# Patient Record
Sex: MALE | Race: WHITE | Employment: FULL TIME | URBAN - METROPOLITAN AREA
[De-identification: names, ages, dates, MRNs, and addresses within clinical notes are randomized per-mention and may not be internally consistent; named-entity substitution may affect disease eponyms.]

---

## 2017-01-06 ENCOUNTER — OFFICE VISIT (OUTPATIENT)
Dept: FAMILY MEDICINE CLINIC | Age: 57
End: 2017-01-06

## 2017-01-06 VITALS
DIASTOLIC BLOOD PRESSURE: 84 MMHG | RESPIRATION RATE: 18 BRPM | SYSTOLIC BLOOD PRESSURE: 138 MMHG | TEMPERATURE: 98.2 F | BODY MASS INDEX: 39.34 KG/M2 | WEIGHT: 281 LBS | HEIGHT: 71 IN | HEART RATE: 100 BPM | OXYGEN SATURATION: 98 %

## 2017-01-06 DIAGNOSIS — I10 ESSENTIAL HYPERTENSION: ICD-10-CM

## 2017-01-06 DIAGNOSIS — B19.20 HEPATITIS C VIRUS INFECTION WITHOUT HEPATIC COMA, UNSPECIFIED CHRONICITY: ICD-10-CM

## 2017-01-06 DIAGNOSIS — R79.89 ELEVATED LFTS: ICD-10-CM

## 2017-01-06 DIAGNOSIS — S83.412A SPRAIN OF MEDIAL COLLATERAL LIGAMENT OF LEFT KNEE, INITIAL ENCOUNTER: ICD-10-CM

## 2017-01-06 DIAGNOSIS — R73.03 PREDIABETES: Primary | ICD-10-CM

## 2017-01-06 RX ORDER — LISINOPRIL 10 MG/1
10 TABLET ORAL DAILY
Qty: 30 TAB | Refills: 1 | Status: SHIPPED | OUTPATIENT
Start: 2017-01-06 | End: 2017-01-23

## 2017-01-06 RX ORDER — DICLOFENAC SODIUM 75 MG/1
75 TABLET, DELAYED RELEASE ORAL 2 TIMES DAILY
Qty: 60 TAB | Refills: 1 | Status: SHIPPED | OUTPATIENT
Start: 2017-01-06 | End: 2017-01-20

## 2017-01-06 NOTE — PROGRESS NOTES
Myrna Garcia is a 64 y.o. male   Chief Complaint   Patient presents with    Knee Pain     right    pt states has some R knee pain and swelling. Denies any trauma, majority of pain is medial per patient. Took some motrin 800mg bid and helped with the swelling but did not alleviate any of the pain. Pt also with prediabetes and elevated LFT's on prior check. Will check labs and have discussed diabetic diet. Pt states he thinks he has Hep C from an incident at [facility].  Pt Bp is elevated and did come down after sitting for a bit but is still high normal.      Chief Complaint   Patient presents with    Knee Pain     right     he is a 64y.o. year old male who presents for evalution. Reviewed PmHx, RxHx, FmHx, SocHx, AllgHx and updated and dated in the chart. Review of Systems - negative except as listed above in the HPI    Objective:     Vitals:    01/06/17 0914 01/06/17 0935   BP: 142/90 138/84   Pulse: 100    Resp: 18    Temp: 98.2 °F (36.8 °C)    TempSrc: Oral    SpO2: 98%    Weight: 281 lb (127.5 kg)    Height: 5' 10.5\" (1.791 m)        Current Outpatient Prescriptions   Medication Sig    diclofenac EC (VOLTAREN) 75 mg EC tablet Take 1 Tab by mouth two (2) times a day. As needed    lisinopril (PRINIVIL, ZESTRIL) 10 mg tablet Take 1 Tab by mouth daily.  amLODIPine (NORVASC) 10 mg tablet TAKE 1 TABLET BY MOUTH DAILY     No current facility-administered medications for this visit. Physical Examination: General appearance - alert, well appearing, and in no distress  Eyes - pupils equal and reactive, extraocular eye movements intact  Chest - clear to auscultation, no wheezes, rales or rhonchi, symmetric air entry  Heart - normal rate, regular rhythm, normal S1, S2, no murmurs, rubs, clicks or gallops  Musculoskeletal - mild edema without erythema  ttp over MCl and pain with valgus stress. Assessment/ Plan:   Brenda Pritchett was seen today for knee pain.     Diagnoses and all orders for this visit:    Prediabetes  -     HEMOGLOBIN A1C WITH EAG  -     METABOLIC PANEL, COMPREHENSIVE    Sprain of medial collateral ligament of left knee, initial encounter  -     diclofenac EC (VOLTAREN) 75 mg EC tablet; Take 1 Tab by mouth two (2) times a day. As needed    Elevated LFTs  -     METABOLIC PANEL, COMPREHENSIVE    Hepatitis C virus infection without hepatic coma, unspecified chronicity  -     HEPATITIS C QT BY PCR WITH REFLEX GENOTYPE    Essential hypertension  -     lisinopril (PRINIVIL, ZESTRIL) 10 mg tablet; Take 1 Tab by mouth daily. Follow-up Disposition:  Return in about 1 month (around 2/6/2017), or if symptoms worsen or fail to improve. I have discussed the diagnosis with the patient and the intended plan as seen in the above orders. The patient has received an after-visit summary and questions were answered concerning future plans. Pt conveyed understanding of plan.     Medication Side Effects and Warnings were discussed with patient      Stacy Mei, DO

## 2017-01-06 NOTE — PATIENT INSTRUCTIONS
Medial Collateral Ligament Sprain: Rehab Exercises  Your Care Instructions  Here are some examples of typical rehabilitation exercises for your condition. Start each exercise slowly. Ease off the exercise if you start to have pain. Your doctor or physical therapist will tell you when you can start these exercises and which ones will work best for you. How to do the exercises  Knee flexion with heel slide    1. Lie on your back with your knees bent. 2. Slide your heel back by bending your affected knee as far as you can. Then hook your other foot around your ankle to help pull your heel even farther back. 3. Hold for about 6 seconds, then rest for up to 10 seconds. 4. Repeat 8 to 12 times. Heel slides on a wall    1. Lie on the floor close enough to a wall so that you can place both legs up on the wall. Your hips should be as close to the wall as is comfortable for you. 2. Start with both feet resting on the wall. Slowly let the foot of your affected leg slide down the wall until you feel a stretch in your knee. 3. Hold for 15 to 30 seconds. 4. Then slowly slide your foot up to where you started. 5. Repeat 2 to 4 times. Quad sets    1. Sit with your affected leg straight and supported on the floor or a firm bed. Place a small, rolled-up towel under your knee. Your other leg should be bent, with that foot flat on the floor. 2. Tighten the thigh muscles of your affected leg by pressing the back of your knee down into the towel. 3. Hold for about 6 seconds, then rest for up to 10 seconds. 4. Repeat 8 to 12 times. Short-arc quad    1. Lie on your back with your knees bent over a foam roll or a large rolled-up towel. 2. Lift the lower part of your affected leg and straighten your knee by tightening your thigh muscle. Keep the bottom of your knee on the foam roll or rolled-up towel. 3. Hold your knee straight for about 6 seconds, then slowly bend your knee and lower your leg back to the floor.  Rest for up to 10 seconds between repetitions. 4. Repeat 8 to 12 times. Straight-leg raises to the front    1. Lie on your back with your good knee bent so that your foot rests flat on the floor. Your affected leg should be straight. Make sure that your low back has a normal curve. You should be able to slip your hand in between the floor and the small of your back, with your palm touching the floor and your back touching the back of your hand. 2. Tighten the thigh muscles in your affected leg by pressing the back of your knee flat down to the floor. Hold your knee straight. 3. Keeping the thigh muscles tight and your leg straight, lift your affected leg up so that your heel is about 12 inches off the floor. Hold for about 6 seconds, then lower slowly. 4. Relax for up to 10 seconds between repetitions. 5. Repeat 8 to 12 times. Hamstring set (heel dig)    1. Sit with your affected leg bent. Your good leg should be straight and supported on the floor. 2. Tighten the muscles on the back of your bent leg (hamstring) by pressing your heel into the floor. 3. Hold for about 6 seconds, then rest for up to 10 seconds. 4. Repeat 8 to 12 times. Hip adduction    Note: You will need a pillow for this exercise. 1. Sit on the floor with your knees bent. 2. Place a pillow between your knees. 3. Put your hands slightly behind your hips for support. 4. Squeeze the pillow by tightening the muscles on the inside of your thighs. 5. Hold for 6 seconds, then rest for up to 10 seconds. 6. Repeat 8 to 12 times. Hip abduction    Note: You will need a small pillow for this exercise. 1. Sit on the floor with your affected knee close to a wall. 2. Bend your affected knee but keep the other leg straight in front of you. 3. Place a pillow between the outside of your knee and the wall. 4. Put your hands slightly behind your hips for support. 5. Push the outside of your knee against the pillow and the wall.   6. Hold for 6 seconds, then rest for up to 10 seconds. 7. Repeat 8 to 12 times. Lateral step-up    1. Stand sideways on the bottom step of a staircase with your injured leg on the step and your other foot on the floor. Hold on to the banister or wall. 2. Use your injured leg to raise yourself up, bringing your other foot level with the stair step. Make sure to keep your hips level as you do this. And try to keep your knee moving in a straight line with your middle toe. Do not put the foot you are raising on the stair step. 3. Slowly lower your foot back down. 4. Repeat 8 to 12 times. Wall squats with ball    Note: You will need a large therapy ball for this exercise. Ask your doctor or physical therapist what size you will need, but it should be large enough to cover your back. 1. Stand with your back facing a wall. Place your feet about a shoulder-width apart. 2. Place the therapy ball between your back and the wall, and move your feet out in front of you so they are about a foot in front of your hips. 3. Keep your arms at your sides, or put your hands on your hips. 4. Slowly squat down as if you are going to sit in a chair, rolling your back over the ball as you squat. The ball should move with you but stay pressed into the wall. 5. Be sure that your knees do not go in front of your toes as you squat. 6. Hold for 6 seconds. 7. Slowly rise to your standing position. 8. Repeat 8 to 12 times. Follow-up care is a key part of your treatment and safety. Be sure to make and go to all appointments, and call your doctor if you are having problems. It's also a good idea to know your test results and keep a list of the medicines you take. Where can you learn more? Go to http://radha-miranda.info/. Enter R100 in the search box to learn more about \"Medial Collateral Ligament Sprain: Rehab Exercises. \"  Current as of: May 23, 2016  Content Version: 11.1  © 4765-8285 [employer].  Care instructions adapted under license by [employer] (which disclaims liability or warranty for this information). If you have questions about a medical condition or this instruction, always ask your healthcare professional. [address] any warranty or liability for your use of this information. Prediabetes: Care Instructions  Your Care Instructions  Prediabetes is a warning sign that you are at risk for getting type 2 diabetes. It means that your blood sugar is higher than it should be. The food you eat turns into sugar, which your body uses for energy. Normally, an organ called the pancreas makes insulin, which allows the sugar in your blood to get into your body's cells. But when your body can't use insulin the right way, the sugar doesn't move into cells. It stays in your blood instead. This is called insulin resistance. The buildup of sugar in the blood causes prediabetes. The good news is that lifestyle changes may help you get your blood sugar back to normal and help you avoid or delay diabetes. Follow-up care is a key part of your treatment and safety. Be sure to make and go to all appointments, and call your doctor if you are having problems. It's also a good idea to know your test results and keep a list of the medicines you take. How can you care for yourself at home? · Watch your weight. A healthy weight helps your body use insulin properly. · Limit the amount of calories, sweets, and unhealthy fat you eat. Ask your doctor if you should see a dietitian. A registered dietitian can help you create meal plans that fit your lifestyle. · Get at least 30 minutes of exercise on most days of the week. Exercise helps control your blood sugar. It also helps you maintain a healthy weight. Walking is a good choice. You also may want to do other activities, such as running, swimming, cycling, or playing tennis or team sports. · Do not smoke. Smoking can make prediabetes worse.  If you need help quitting, talk to your doctor about stop-smoking programs and medicines. These can increase your chances of quitting for good. · If your doctor prescribed medicines, take them exactly as prescribed. Call your doctor if you think you are having a problem with your medicine. You will get more details on the specific medicines your doctor prescribes. When should you call for help? Watch closely for changes in your health, and be sure to contact your doctor if:  · You have any symptoms of diabetes. These may include:  ¨ Being thirsty more often. ¨ Urinating more. ¨ Being hungrier. ¨ Losing weight. ¨ Being very tired. ¨ Having blurry vision. · You have a wound that will not heal.  · You have an infection that will not go away. · You have problems with your blood pressure. · You want more information about diabetes and how you can keep from getting it. Where can you learn more? Go to http://radha-miranda.info/. Enter I222 in the search box to learn more about \"Prediabetes: Care Instructions. \"  Current as of: May 23, 2016  Content Version: 11.1  © 6410-4495 [employer]. Care instructions adapted under license by [employer]. (which disclaims liability or warranty for this information). If you have questions about a medical condition or this instruction, always ask your healthcare professional. [address] any warranty or liability for your use of this information.

## 2017-01-06 NOTE — MR AVS SNAPSHOT
Visit Information Date & Time Provider Department Dept. Phone Encounter #  
 1/6/2017  9:00 AM Chaya , [address] [phone] [id number] Follow-up Instructions Return in about 1 month (around 2/6/2017), or if symptoms worsen or fail to improve. Upcoming Health Maintenance Date Due COLONOSCOPY 12/13/1978 Pneumococcal 19-64 Medium Risk (1 of 1 - PPSV23) 12/13/1979 DTaP/Tdap/Td series (1 - Tdap) 12/13/1981 Allergies as of 1/6/2017  Review Complete On: 1/6/2017 By: Chaya Alvarado,  No Known Allergies Current Immunizations  Reviewed on 12/14/2015 No immunizations on file. Not reviewed this visit You Were Diagnosed With   
  
 Codes Comments Prediabetes    -  Primary ICD-10-CM: R73.03 
ICD-9-CM: 790.29 Sprain of medial collateral ligament of left knee, initial encounter     ICD-10-CM: U63.877H ICD-9-CM: 844.1 Elevated LFTs     ICD-10-CM: R79.89 ICD-9-CM: 790.6 Hepatitis C virus infection without hepatic coma, unspecified chronicity     ICD-10-CM: B19.20 ICD-9-CM: 070.70 Essential hypertension     ICD-10-CM: I10 
ICD-9-CM: 401.9 Vitals BP Pulse Temp Resp Height(growth percentile) Weight(growth percentile) 138/84 (BP 1 Location: Right arm, BP Patient Position: Sitting) 100 98.2 °F (36.8 °C) (Oral) 18 5' 10.5\" (1.791 m) 281 lb (127.5 kg) SpO2 BMI Smoking Status 98% 39.75 kg/m2 Never Smoker Vitals History BMI and BSA Data Body Mass Index Body Surface Area 39.75 kg/m 2 2.52 m 2 Preferred Pharmacy Pharmacy Name Phone [id number], [address] [place] [phone] Your Updated Medication List  
  
   
This list is accurate as of: 1/6/17  9:42 AM.  Always use your most recent med list. amLODIPine 10 mg tablet Commonly known as:  [place] TAKE 1 TABLET BY MOUTH DAILY  
  
 diclofenac EC 75 mg EC tablet Commonly known as:  VOLTAREN Take 1 Tab by mouth two (2) times a day. As needed  
  
 lisinopril 10 mg tablet Commonly known as:  Donnald Code Take 1 Tab by mouth daily. Prescriptions Sent to Pharmacy Refills  
 diclofenac EC (VOLTAREN) 75 mg EC tablet 1 Sig: Take 1 Tab by mouth two (2) times a day. As needed Class: Normal  
 Pharmacy: [facility], [address] Ph #: [phone] Route: Oral  
 lisinopril (PRINIVIL, ZESTRIL) 10 mg tablet 1 Sig: Take 1 Tab by mouth daily. Class: Normal  
 Pharmacy: [facility] Ph #: [phone] Route: Oral  
  
We Performed the Following HEMOGLOBIN A1C WITH EAG [42777 CPT(R)] HEPATITIS C QT BY PCR WITH REFLEX GENOTYPE [[id number] Custom] METABOLIC PANEL, COMPREHENSIVE [74422 CPT(R)] Follow-up Instructions Return in about 1 month (around 2/6/2017), or if symptoms worsen or fail to improve. Patient Instructions Medial Collateral Ligament Sprain: Rehab Exercises Your Care Instructions Here are some examples of typical rehabilitation exercises for your condition. Start each exercise slowly. Ease off the exercise if you start to have pain. Your doctor or physical therapist will tell you when you can start these exercises and which ones will work best for you. How to do the exercises Knee flexion with heel slide 1. Lie on your back with your knees bent. 2. Slide your heel back by bending your affected knee as far as you can. Then hook your other foot around your ankle to help pull your heel even farther back. 3. Hold for about 6 seconds, then rest for up to 10 seconds. 4. Repeat 8 to 12 times. Heel slides on a wall 1. Lie on the floor close enough to a wall so that you can place both legs up on the wall. Your hips should be as close to the wall as is comfortable for you. 2. Start with both feet resting on the wall. Slowly let the foot of your affected leg slide down the wall until you feel a stretch in your knee. 3. Hold for 15 to 30 seconds. 4. Then slowly slide your foot up to where you started. 5. Repeat 2 to 4 times. [employer] 1. Sit with your affected leg straight and supported on the floor or a firm bed. Place a small, rolled-up towel under your knee. Your other leg should be bent, with that foot flat on the floor. 2. Tighten the thigh muscles of your affected leg by pressing the back of your knee down into the towel. 3. Hold for about 6 seconds, then rest for up to 10 seconds. 4. Repeat 8 to 12 times. Short-arc quad 1. Lie on your back with your knees bent over a foam roll or a large rolled-up towel. 2. Lift the lower part of your affected leg and straighten your knee by tightening your thigh muscle. Keep the bottom of your knee on the foam roll or rolled-up towel. 3. Hold your knee straight for about 6 seconds, then slowly bend your knee and lower your leg back to the floor. Rest for up to 10 seconds between repetitions. 4. Repeat 8 to 12 times. Straight-leg raises to the front 1. Lie on your back with your good knee bent so that your foot rests flat on the floor. Your affected leg should be straight. Make sure that your low back has a normal curve. You should be able to slip your hand in between the floor and the small of your back, with your palm touching the floor and your back touching the back of your hand. 2. Tighten the thigh muscles in your affected leg by pressing the back of your knee flat down to the floor. Hold your knee straight. 3. Keeping the thigh muscles tight and your leg straight, lift your affected leg up so that your heel is about 12 inches off the floor. Hold for about 6 seconds, then lower slowly. 4. Relax for up to 10 seconds between repetitions. 5. Repeat 8 to 12 times. Hamstring set (heel dig) 1. Sit with your affected leg bent. Your good leg should be straight and supported on the floor. 2. Tighten the muscles on the back of your bent leg (hamstring) by pressing your heel into the floor. 3. Hold for about 6 seconds, then rest for up to 10 seconds. 4. Repeat 8 to 12 times. Hip adduction Note: You will need a pillow for this exercise. 1. Sit on the floor with your knees bent. 2. Place a pillow between your knees. 3. Put your hands slightly behind your hips for support. 4. Squeeze the pillow by tightening the muscles on the inside of your thighs. 5. Hold for 6 seconds, then rest for up to 10 seconds. 6. Repeat 8 to 12 times. Hip abduction Note: You will need a small pillow for this exercise. 1. Sit on the floor with your affected knee close to a wall. 2. Bend your affected knee but keep the other leg straight in front of you. 3. Place a pillow between the outside of your knee and the wall. 4. Put your hands slightly behind your hips for support. 5. Push the outside of your knee against the pillow and the wall. 6. Hold for 6 seconds, then rest for up to 10 seconds. 7. Repeat 8 to 12 times. Lateral step-up 1. Stand sideways on the bottom step of a staircase with your injured leg on the step and your other foot on the floor. Hold on to the banister or wall. 2. Use your injured leg to raise yourself up, bringing your other foot level with the stair step. Make sure to keep your hips level as you do this. And try to keep your knee moving in a straight line with your middle toe. Do not put the foot you are raising on the stair step. 3. Slowly lower your foot back down. 4. Repeat 8 to 12 times. Wall squats with ball Note: You will need a large therapy ball for this exercise. Ask your doctor or physical therapist what size you will need, but it should be large enough to cover your back. 1. Stand with your back facing a wall. Place your feet about a shoulder-width apart. 2. Place the therapy ball between your back and the wall, and move your feet out in front of you so they are about a foot in front of your hips. 3. Keep your arms at your sides, or put your hands on your hips. 4. Slowly squat down as if you are going to sit in a chair, rolling your back over the ball as you squat. The ball should move with you but stay pressed into the wall. 5. Be sure that your knees do not go in front of your toes as you squat. 6. Hold for 6 seconds. 7. Slowly rise to your standing position. 8. Repeat 8 to 12 times. Follow-up care is a key part of your treatment and safety. Be sure to make and go to all appointments, and call your doctor if you are having problems. It's also a good idea to know your test results and keep a list of the medicines you take. Where can you learn more? Go to http://radha-miranda.info/. Enter R100 in the search box to learn more about \"Medial Collateral Ligament Sprain: Rehab Exercises. \" Current as of: May 23, 2016 Content Version: 11.1 © 2342-0141 Induction Manager. Care instructions adapted under license by [employer] (which disclaims liability or warranty for this information). If you have questions about a medical condition or this instruction, always ask your healthcare professional. [address] any warranty or liability for your use of this information. Prediabetes: Care Instructions Your Care Instructions Prediabetes is a warning sign that you are at risk for getting type 2 diabetes. It means that your blood sugar is higher than it should be. The food you eat turns into sugar, which your body uses for energy. Normally, an organ called the pancreas makes insulin, which allows the sugar in your blood to get into your body's cells. But when your body can't use insulin the right way, the sugar doesn't move into cells.  It stays in your blood instead. This is called insulin resistance. The buildup of sugar in the blood causes prediabetes. The good news is that lifestyle changes may help you get your blood sugar back to normal and help you avoid or delay diabetes. Follow-up care is a key part of your treatment and safety. Be sure to make and go to all appointments, and call your doctor if you are having problems. It's also a good idea to know your test results and keep a list of the medicines you take. How can you care for yourself at home? · Watch your weight. A healthy weight helps your body use insulin properly. · Limit the amount of calories, sweets, and unhealthy fat you eat. Ask your doctor if you should see a dietitian. A registered dietitian can help you create meal plans that fit your lifestyle. · Get at least 30 minutes of exercise on most days of the week. Exercise helps control your blood sugar. It also helps you maintain a healthy weight. Walking is a good choice. You also may want to do other activities, such as running, swimming, cycling, or playing tennis or team sports. · Do not smoke. Smoking can make prediabetes worse. If you need help quitting, talk to your doctor about stop-smoking programs and medicines. These can increase your chances of quitting for good. · If your doctor prescribed medicines, take them exactly as prescribed. Call your doctor if you think you are having a problem with your medicine. You will get more details on the specific medicines your doctor prescribes. When should you call for help? Watch closely for changes in your health, and be sure to contact your doctor if: 
· You have any symptoms of diabetes. These may include: ¨ Being thirsty more often. ¨ Urinating more. ¨ Being hungrier. ¨ Losing weight. ¨ Being very tired. ¨ Having blurry vision. · You have a wound that will not heal. 
· You have an infection that will not go away. · You have problems with your blood pressure. · You want more information about diabetes and how you can keep from getting it. Where can you learn more? Go to http://radha-miranda.info/. Enter I222 in the search box to learn more about \"Prediabetes: Care Instructions. \" Current as of: May 23, 2016 Content Version: 11.1 © 3203-7153 misterbnb. Care instructions adapted under license by [employer] (which disclaims liability or warranty for this information). If you have questions about a medical condition or this instruction, always ask your healthcare professional. [address] any warranty or liability for your use of this information. Introducing [facility]! Frantz Reeder introduces AthleteTrax patient portal. Now you can access parts of your medical record, email your doctor's office, and request medication refills online. 1. In your internet browser, go to [email or web address] 2. Click on the First Time User? Click Here link in the Sign In box. You will see the New Member Sign Up page. 3. Enter your AthleteTrax Access Code exactly as it appears below. You will not need to use this code after youve completed the sign-up process. If you do not sign up before the expiration date, you must request a new code. · AthleteTrax Access Code: [email or web address] Expires: 4/6/2017  9:41 AM 
 
4. Enter the last four digits of your Social Security Number (xxxx) and Date of Birth (mm/dd/yyyy) as indicated and click Submit. You will be taken to the next sign-up page. 5. Create a Mailboxt ID. This will be your AthleteTrax login ID and cannot be changed, so think of one that is secure and easy to remember. 6. Create a AthleteTrax password. You can change your password at any time. 7. Enter your Password Reset Question and Answer. This can be used at a later time if you forget your password. 8. Enter your e-mail address.  You will receive e-mail notification when new information is available in [email or web address]. 9. Click Sign Up. You can now view and download portions of your medical record. 10. Click the Download Summary menu link to download a portable copy of your medical information. If you have questions, please visit the Frequently Asked Questions section of the [email or web address] website. Remember, Peap.co is NOT to be used for urgent needs. For medical emergencies, dial 911. Now available from your iPhone and Android! Please provide this summary of care documentation to your next provider. Your primary care clinician is listed as ESVIN DESIR. If you have any questions after today's visit, please call [phone].

## 2017-01-07 LAB
ALBUMIN SERPL-MCNC: 3.8 G/DL (ref 3.5–5.5)
ALBUMIN/GLOB SERPL: 1.1 {RATIO} (ref 1.1–2.5)
ALP SERPL-CCNC: 104 IU/L (ref 39–117)
ALT SERPL-CCNC: 33 IU/L (ref 0–44)
AST SERPL-CCNC: 64 IU/L (ref 0–40)
BILIRUB SERPL-MCNC: 1 MG/DL (ref 0–1.2)
BUN SERPL-MCNC: 16 MG/DL (ref 6–24)
BUN/CREAT SERPL: 17 (ref 9–20)
CALCIUM SERPL-MCNC: 9.1 MG/DL (ref 8.7–10.2)
CHLORIDE SERPL-SCNC: 103 MMOL/L (ref 96–106)
CO2 SERPL-SCNC: 20 MMOL/L (ref 18–29)
CREAT SERPL-MCNC: 0.96 MG/DL (ref 0.76–1.27)
EST. AVERAGE GLUCOSE BLD GHB EST-MCNC: 108 MG/DL
GLOBULIN SER CALC-MCNC: 3.6 G/DL (ref 1.5–4.5)
GLUCOSE SERPL-MCNC: 105 MG/DL (ref 65–99)
HBA1C MFR BLD: 5.4 % (ref 4.8–5.6)
POTASSIUM SERPL-SCNC: 4.1 MMOL/L (ref 3.5–5.2)
PROT SERPL-MCNC: 7.4 G/DL (ref 6–8.5)
SODIUM SERPL-SCNC: 141 MMOL/L (ref 134–144)

## 2017-01-11 ENCOUNTER — DOCUMENTATION ONLY (OUTPATIENT)
Dept: FAMILY MEDICINE CLINIC | Age: 57
End: 2017-01-11

## 2017-01-11 LAB
HCV GENOTYPE: NORMAL
HCV GENTYP SERPL NAA+PROBE: NORMAL
HCV RNA SERPL NAA+PROBE-ACNC: NORMAL IU/ML
HCV RNA SERPL NAA+PROBE-LOG IU: 5.94 LOG10 IU/ML
PLEASE NOTE, 550474: NORMAL
TEST INFORMATION: NORMAL

## 2017-01-11 NOTE — PROGRESS NOTES
Pt called and gave us verbal consent to talk to his wife about his labs and all. Going to get paperwork done when he returns home. Thanks. Wifes number is [phone].

## 2017-01-11 NOTE — PROGRESS NOTES
Hep C pos like he thought genotype 1a.   Pt should make an appt with hepatology Dr Azam Pearson [phone]

## 2017-01-19 ENCOUNTER — TELEPHONE (OUTPATIENT)
Dept: FAMILY MEDICINE CLINIC | Age: 57
End: 2017-01-19

## 2017-01-19 NOTE — TELEPHONE ENCOUNTER
Pt sent [employer] message stating having black stools, Pt states he is having black tarry stools, but no light headedness. No CP. Pt was taking antacids not sure of type but was not taking pepto. Pt states he is also having emesis. Pt advised to go to ED. Pt conveyed understanding and will go now. Pt advised to stop diclofenac.

## 2017-01-20 ENCOUNTER — ANESTHESIA (OUTPATIENT)
Dept: ENDOSCOPY | Age: 57
DRG: 378 | End: 2017-01-20
Payer: SELF-PAY

## 2017-01-20 ENCOUNTER — ANESTHESIA EVENT (OUTPATIENT)
Dept: ENDOSCOPY | Age: 57
DRG: 378 | End: 2017-01-20
Payer: SELF-PAY

## 2017-01-20 ENCOUNTER — HOSPITAL ENCOUNTER (INPATIENT)
Age: 57
LOS: 3 days | Discharge: HOME | DRG: 378 | End: 2017-01-23
Attending: EMERGENCY MEDICINE | Admitting: FAMILY MEDICINE
Payer: SELF-PAY

## 2017-01-20 DIAGNOSIS — K92.1 MELENA: ICD-10-CM

## 2017-01-20 DIAGNOSIS — D62 ACUTE BLOOD LOSS ANEMIA: ICD-10-CM

## 2017-01-20 DIAGNOSIS — K92.0 GASTROINTESTINAL HEMORRHAGE WITH HEMATEMESIS: Primary | ICD-10-CM

## 2017-01-20 PROBLEM — K92.2 GI BLEED DUE TO NSAIDS: Status: ACTIVE | Noted: 2017-01-20

## 2017-01-20 PROBLEM — Z87.898 HISTORY OF ULCER DISEASE: Status: ACTIVE | Noted: 2017-01-20

## 2017-01-20 PROBLEM — T39.395A GI BLEED DUE TO NSAIDS: Status: ACTIVE | Noted: 2017-01-20

## 2017-01-20 LAB
ANION GAP BLD CALC-SCNC: 12 MMOL/L (ref 5–15)
BASOPHILS # BLD AUTO: 0.1 K/UL (ref 0–0.1)
BASOPHILS # BLD: 1 % (ref 0–1)
BUN SERPL-MCNC: 64 MG/DL (ref 6–20)
BUN/CREAT SERPL: 49 (ref 12–20)
CALCIUM SERPL-MCNC: 7.8 MG/DL (ref 8.5–10.1)
CHLORIDE SERPL-SCNC: 107 MMOL/L (ref 97–108)
CO2 SERPL-SCNC: 22 MMOL/L (ref 21–32)
CREAT SERPL-MCNC: 1.3 MG/DL (ref 0.7–1.3)
EOSINOPHIL # BLD: 0.3 K/UL (ref 0–0.4)
EOSINOPHIL NFR BLD: 2 % (ref 0–7)
ERYTHROCYTE [DISTWIDTH] IN BLOOD BY AUTOMATED COUNT: 17.4 % (ref 11.5–14.5)
GLUCOSE SERPL-MCNC: 141 MG/DL (ref 65–100)
HCT VFR BLD AUTO: 21.9 % (ref 36.6–50.3)
HCT VFR BLD AUTO: 23.9 % (ref 36.6–50.3)
HCT VFR BLD AUTO: 25.7 % (ref 36.6–50.3)
HEMOCCULT STL QL: POSITIVE
HGB BLD-MCNC: 7.2 G/DL (ref 12.1–17)
HGB BLD-MCNC: 7.7 G/DL (ref 12.1–17)
HGB BLD-MCNC: 8.6 G/DL (ref 12.1–17)
LYMPHOCYTES # BLD AUTO: 37 % (ref 12–49)
LYMPHOCYTES # BLD: 5.3 K/UL (ref 0.8–3.5)
MCH RBC QN AUTO: 30 PG (ref 26–34)
MCHC RBC AUTO-ENTMCNC: 32.2 G/DL (ref 30–36.5)
MCV RBC AUTO: 93 FL (ref 80–99)
MONOCYTES # BLD: 1.4 K/UL (ref 0–1)
MONOCYTES NFR BLD AUTO: 10 % (ref 5–13)
NEUTS SEG # BLD: 7.3 K/UL (ref 1.8–8)
NEUTS SEG NFR BLD AUTO: 50 % (ref 32–75)
PLATELET # BLD AUTO: 336 K/UL (ref 150–400)
POTASSIUM SERPL-SCNC: 3.8 MMOL/L (ref 3.5–5.1)
RBC # BLD AUTO: 2.57 M/UL (ref 4.1–5.7)
SODIUM SERPL-SCNC: 141 MMOL/L (ref 136–145)
WBC # BLD AUTO: 14.3 K/UL (ref 4.1–11.1)

## 2017-01-20 PROCEDURE — 86920 COMPATIBILITY TEST SPIN: CPT | Performed by: FAMILY MEDICINE

## 2017-01-20 PROCEDURE — 76060000031 HC ANESTHESIA FIRST 0.5 HR: Performed by: SPECIALIST

## 2017-01-20 PROCEDURE — 3E0G8GC INTRODUCTION OF OTHER THERAPEUTIC SUBSTANCE INTO UPPER GI, VIA NATURAL OR ARTIFICIAL OPENING ENDOSCOPIC: ICD-10-PCS | Performed by: SPECIALIST

## 2017-01-20 PROCEDURE — 36430 TRANSFUSION BLD/BLD COMPNT: CPT

## 2017-01-20 PROCEDURE — 0DJ08ZZ INSPECTION OF UPPER INTESTINAL TRACT, VIA NATURAL OR ARTIFICIAL OPENING ENDOSCOPIC: ICD-10-PCS | Performed by: SPECIALIST

## 2017-01-20 PROCEDURE — 74011000250 HC RX REV CODE- 250

## 2017-01-20 PROCEDURE — 86900 BLOOD TYPING SEROLOGIC ABO: CPT | Performed by: FAMILY MEDICINE

## 2017-01-20 PROCEDURE — 74011250636 HC RX REV CODE- 250/636

## 2017-01-20 PROCEDURE — 30233P1 TRANSFUSION OF NONAUTOLOGOUS FROZEN RED CELLS INTO PERIPHERAL VEIN, PERCUTANEOUS APPROACH: ICD-10-PCS | Performed by: FAMILY MEDICINE

## 2017-01-20 PROCEDURE — 77030021678 HC GLIDESCP STAT DISP VERT -B: Performed by: ANESTHESIOLOGY

## 2017-01-20 PROCEDURE — 77030020391 HC TU TRACH GLDRT VERT -A: Performed by: ANESTHESIOLOGY

## 2017-01-20 PROCEDURE — 85025 COMPLETE CBC W/AUTO DIFF WBC: CPT | Performed by: FAMILY MEDICINE

## 2017-01-20 PROCEDURE — 82272 OCCULT BLD FECES 1-3 TESTS: CPT | Performed by: FAMILY MEDICINE

## 2017-01-20 PROCEDURE — 77030029131 HC ADMN ST IV BLD N DEHP ICUM -B

## 2017-01-20 PROCEDURE — 74011250636 HC RX REV CODE- 250/636: Performed by: FAMILY MEDICINE

## 2017-01-20 PROCEDURE — 96360 HYDRATION IV INFUSION INIT: CPT

## 2017-01-20 PROCEDURE — 80048 BASIC METABOLIC PNL TOTAL CA: CPT | Performed by: FAMILY MEDICINE

## 2017-01-20 PROCEDURE — P9016 RBC LEUKOCYTES REDUCED: HCPCS | Performed by: FAMILY MEDICINE

## 2017-01-20 PROCEDURE — 76040000019: Performed by: SPECIALIST

## 2017-01-20 PROCEDURE — 74011250636 HC RX REV CODE- 250/636: Performed by: SPECIALIST

## 2017-01-20 PROCEDURE — 36415 COLL VENOUS BLD VENIPUNCTURE: CPT | Performed by: FAMILY MEDICINE

## 2017-01-20 PROCEDURE — 74011000250 HC RX REV CODE- 250: Performed by: FAMILY MEDICINE

## 2017-01-20 PROCEDURE — 65660000000 HC RM CCU STEPDOWN

## 2017-01-20 PROCEDURE — 99285 EMERGENCY DEPT VISIT HI MDM: CPT

## 2017-01-20 PROCEDURE — 77030010857 HC CATH PRB GLD BSC -C: Performed by: SPECIALIST

## 2017-01-20 PROCEDURE — C9113 INJ PANTOPRAZOLE SODIUM, VIA: HCPCS | Performed by: FAMILY MEDICINE

## 2017-01-20 PROCEDURE — 85018 HEMOGLOBIN: CPT | Performed by: FAMILY MEDICINE

## 2017-01-20 RX ORDER — MIDAZOLAM HYDROCHLORIDE 1 MG/ML
.25-5 INJECTION, SOLUTION INTRAMUSCULAR; INTRAVENOUS
Status: DISCONTINUED | OUTPATIENT
Start: 2017-01-20 | End: 2017-01-20 | Stop reason: HOSPADM

## 2017-01-20 RX ORDER — SODIUM CHLORIDE 9 MG/ML
250 INJECTION, SOLUTION INTRAVENOUS AS NEEDED
Status: DISCONTINUED | OUTPATIENT
Start: 2017-01-20 | End: 2017-01-21

## 2017-01-20 RX ORDER — DICLOFENAC SODIUM 75 MG/1
75 TABLET, DELAYED RELEASE ORAL 2 TIMES DAILY WITH MEALS
COMMUNITY
End: 2017-01-23

## 2017-01-20 RX ORDER — EPINEPHRINE 0.1 MG/ML
1 INJECTION INTRACARDIAC; INTRAVENOUS ONCE
Status: COMPLETED | OUTPATIENT
Start: 2017-01-20 | End: 2017-01-20

## 2017-01-20 RX ORDER — SODIUM CHLORIDE 0.9 % (FLUSH) 0.9 %
5-10 SYRINGE (ML) INJECTION AS NEEDED
Status: DISCONTINUED | OUTPATIENT
Start: 2017-01-20 | End: 2017-01-23 | Stop reason: HOSPADM

## 2017-01-20 RX ORDER — SODIUM CHLORIDE 9 MG/ML
50 INJECTION, SOLUTION INTRAVENOUS CONTINUOUS
Status: DISPENSED | OUTPATIENT
Start: 2017-01-20 | End: 2017-01-20

## 2017-01-20 RX ORDER — PROPOFOL 10 MG/ML
INJECTION, EMULSION INTRAVENOUS AS NEEDED
Status: DISCONTINUED | OUTPATIENT
Start: 2017-01-20 | End: 2017-01-20 | Stop reason: HOSPADM

## 2017-01-20 RX ORDER — SUCCINYLCHOLINE CHLORIDE 20 MG/ML
INJECTION INTRAMUSCULAR; INTRAVENOUS AS NEEDED
Status: DISCONTINUED | OUTPATIENT
Start: 2017-01-20 | End: 2017-01-20 | Stop reason: HOSPADM

## 2017-01-20 RX ORDER — PROPOFOL 10 MG/ML
INJECTION, EMULSION INTRAVENOUS
Status: DISCONTINUED | OUTPATIENT
Start: 2017-01-20 | End: 2017-01-20 | Stop reason: HOSPADM

## 2017-01-20 RX ORDER — SODIUM CHLORIDE 9 MG/ML
INJECTION, SOLUTION INTRAVENOUS
Status: DISCONTINUED | OUTPATIENT
Start: 2017-01-20 | End: 2017-01-20 | Stop reason: HOSPADM

## 2017-01-20 RX ORDER — EPINEPHRINE 0.1 MG/ML
INJECTION INTRACARDIAC; INTRAVENOUS
Status: DISPENSED
Start: 2017-01-20 | End: 2017-01-21

## 2017-01-20 RX ORDER — LIDOCAINE HYDROCHLORIDE 20 MG/ML
INJECTION, SOLUTION EPIDURAL; INFILTRATION; INTRACAUDAL; PERINEURAL AS NEEDED
Status: DISCONTINUED | OUTPATIENT
Start: 2017-01-20 | End: 2017-01-20 | Stop reason: HOSPADM

## 2017-01-20 RX ORDER — FENTANYL CITRATE 50 UG/ML
INJECTION, SOLUTION INTRAMUSCULAR; INTRAVENOUS AS NEEDED
Status: DISCONTINUED | OUTPATIENT
Start: 2017-01-20 | End: 2017-01-20 | Stop reason: HOSPADM

## 2017-01-20 RX ORDER — NALOXONE HYDROCHLORIDE 0.4 MG/ML
0.4 INJECTION, SOLUTION INTRAMUSCULAR; INTRAVENOUS; SUBCUTANEOUS
Status: ACTIVE | OUTPATIENT
Start: 2017-01-20 | End: 2017-01-20

## 2017-01-20 RX ORDER — DEXTROMETHORPHAN/PSEUDOEPHED 2.5-7.5/.8
1.2 DROPS ORAL
Status: DISCONTINUED | OUTPATIENT
Start: 2017-01-20 | End: 2017-01-20 | Stop reason: HOSPADM

## 2017-01-20 RX ORDER — FENTANYL CITRATE 50 UG/ML
100 INJECTION, SOLUTION INTRAMUSCULAR; INTRAVENOUS
Status: DISCONTINUED | OUTPATIENT
Start: 2017-01-20 | End: 2017-01-20 | Stop reason: HOSPADM

## 2017-01-20 RX ORDER — NALOXONE HYDROCHLORIDE 0.4 MG/ML
0.4 INJECTION, SOLUTION INTRAMUSCULAR; INTRAVENOUS; SUBCUTANEOUS
Status: DISCONTINUED | OUTPATIENT
Start: 2017-01-20 | End: 2017-01-20 | Stop reason: HOSPADM

## 2017-01-20 RX ORDER — SODIUM CHLORIDE 0.9 % (FLUSH) 0.9 %
5-10 SYRINGE (ML) INJECTION EVERY 8 HOURS
Status: DISCONTINUED | OUTPATIENT
Start: 2017-01-20 | End: 2017-01-23 | Stop reason: HOSPADM

## 2017-01-20 RX ORDER — FLUMAZENIL 0.1 MG/ML
0.2 INJECTION INTRAVENOUS
Status: ACTIVE | OUTPATIENT
Start: 2017-01-20 | End: 2017-01-20

## 2017-01-20 RX ORDER — FLUMAZENIL 0.1 MG/ML
0.2 INJECTION INTRAVENOUS
Status: DISCONTINUED | OUTPATIENT
Start: 2017-01-20 | End: 2017-01-20 | Stop reason: HOSPADM

## 2017-01-20 RX ORDER — SODIUM CHLORIDE 9 MG/ML
175 INJECTION, SOLUTION INTRAVENOUS CONTINUOUS
Status: DISCONTINUED | OUTPATIENT
Start: 2017-01-20 | End: 2017-01-21

## 2017-01-20 RX ADMIN — PROPOFOL 200 MG: 10 INJECTION, EMULSION INTRAVENOUS at 16:16

## 2017-01-20 RX ADMIN — FENTANYL CITRATE 50 MCG: 50 INJECTION, SOLUTION INTRAMUSCULAR; INTRAVENOUS at 16:16

## 2017-01-20 RX ADMIN — Medication 10 ML: at 21:14

## 2017-01-20 RX ADMIN — SODIUM CHLORIDE 1000 ML: 900 INJECTION, SOLUTION INTRAVENOUS at 10:44

## 2017-01-20 RX ADMIN — LIDOCAINE HYDROCHLORIDE 40 MG: 20 INJECTION, SOLUTION EPIDURAL; INFILTRATION; INTRACAUDAL; PERINEURAL at 16:16

## 2017-01-20 RX ADMIN — SODIUM CHLORIDE 50 ML/HR: 900 INJECTION, SOLUTION INTRAVENOUS at 15:34

## 2017-01-20 RX ADMIN — SODIUM CHLORIDE: 9 INJECTION, SOLUTION INTRAVENOUS at 16:13

## 2017-01-20 RX ADMIN — PROPOFOL 150 MCG/KG/MIN: 10 INJECTION, EMULSION INTRAVENOUS at 16:16

## 2017-01-20 RX ADMIN — EPINEPHRINE 0.3 MG: 0.1 INJECTION INTRACARDIAC; INTRAVENOUS at 16:41

## 2017-01-20 RX ADMIN — SODIUM CHLORIDE 40 MG: 9 INJECTION INTRAMUSCULAR; INTRAVENOUS; SUBCUTANEOUS at 21:17

## 2017-01-20 RX ADMIN — SODIUM CHLORIDE 175 ML/HR: 900 INJECTION, SOLUTION INTRAVENOUS at 21:11

## 2017-01-20 RX ADMIN — SUCCINYLCHOLINE CHLORIDE 140 MG: 20 INJECTION INTRAMUSCULAR; INTRAVENOUS at 16:16

## 2017-01-20 RX ADMIN — SODIUM CHLORIDE 40 MG: 9 INJECTION INTRAMUSCULAR; INTRAVENOUS; SUBCUTANEOUS at 12:44

## 2017-01-20 RX ADMIN — SODIUM CHLORIDE 175 ML/HR: 900 INJECTION, SOLUTION INTRAVENOUS at 12:44

## 2017-01-20 NOTE — PROGRESS NOTES
GI note  Consult received chart reviewed. I am off campus, have taken the liberty of scheduling upper GI endoscopy at 1600. Full consult to follow.   Stephanie Patton MD

## 2017-01-20 NOTE — ED PROVIDER NOTES
HPI Comments: 65 yo male with hx of HTN and distant hx of PUD who presents with 3 day history of coffee ground emesis and melena. Recently started on Diclofenac for knee pain 2 weeks ago by PCP, which he stopped taking at the onset of symptoms. He reports that he has had some lightheadedness and skin pallor. Still having nausea and epigastric discomfort. Last episode of emesis was 1-2 days ago. He reports subjective fever, chills and sweats. Denies chest pain, palpitations, syncope. No shortness of breath or BUSCH. He has been taking mostly fluids since the onset of symptoms. He had a distant hx of GIB due to PUD in 1986. Last colonoscopy was within the last 5 years by Dr. Rod Castrejon. Not currently on PPI therapy. The history is provided by the patient and the spouse. Past Medical History:   Diagnosis Date    Hypertension        Past Surgical History:   Procedure Laterality Date    Pr abdomen surgery proc unlisted      Hx gi      Hx hernia repair      Hx splenectomy           Family History:   Problem Relation Age of Onset    Hypertension Mother     Diabetes Sister     Heart Disease Maternal Aunt     Heart Disease Maternal Uncle     Heart Disease Paternal Aunt     Heart Disease Paternal Uncle        Social History     Social History    Marital status:      Spouse name: N/A    Number of children: N/A    Years of education: N/A     Occupational History    Not on file. Social History Main Topics    Smoking status: Never Smoker    Smokeless tobacco: Never Used    Alcohol use No    Drug use: No    Sexual activity: No     Other Topics Concern    Not on file     Social History Narrative         ALLERGIES: Review of patient's allergies indicates no known allergies. Review of Systems   Constitutional: Positive for appetite change, chills and fever. Negative for diaphoresis. HENT: Negative for congestion, rhinorrhea and sore throat.     Respiratory: Negative for cough, chest tightness and shortness of breath. Cardiovascular: Positive for leg swelling. Negative for chest pain and palpitations. Gastrointestinal: Positive for abdominal pain, blood in stool, nausea and vomiting. Negative for constipation and diarrhea. Genitourinary: Negative for dysuria and hematuria. Musculoskeletal: Positive for arthralgias. Negative for joint swelling. Skin: Positive for color change and pallor. Neurological: Positive for light-headedness. Negative for syncope, weakness, numbness and headaches. All other systems reviewed and are negative. Vitals:    01/20/17 0915   BP: 122/61   Pulse: (!) 107   Resp: 20   Temp: 97.9 °F (36.6 °C)   SpO2: 98%   Weight: 124.7 kg (275 lb)   Height: 5' 10.5\" (1.791 m)            Physical Exam   Constitutional: He is oriented to person, place, and time. He appears well-developed and well-nourished. No distress. HENT:   Head: Normocephalic and atraumatic. Right Ear: External ear normal.   Left Ear: External ear normal.   Mouth/Throat: Oropharynx is clear and moist.   Eyes: Conjunctivae and EOM are normal. Pupils are equal, round, and reactive to light. Neck: Normal range of motion. Cardiovascular: Regular rhythm, normal heart sounds and intact distal pulses. Tachycardia present. No murmur heard. Pulmonary/Chest: Effort normal and breath sounds normal. No respiratory distress. He has no wheezes. He has no rales. Abdominal: Soft. Bowel sounds are normal. There is tenderness in the epigastric area. There is no guarding. Genitourinary: Rectum normal and prostate normal.   Genitourinary Comments: +Melena present. No bright red blood. Musculoskeletal: Normal range of motion. He exhibits no edema or tenderness. Neurological: He is alert and oriented to person, place, and time. He exhibits normal muscle tone. Coordination normal.   Skin: Skin is warm and dry. He is not diaphoretic. No pallor.    Psychiatric: He has a normal mood and affect. Thought content normal.        MDM  Number of Diagnoses or Management Options  Diagnosis management comments: Assessment: 63 yo male with likely UGIB from NSAID use. Melena on exam.  Will check labs, give fluids. Will likely require admission. ED Course       Procedures    11:17 AM  Consult:  Discussed patient with Family Practice. They will admit. 11:18 AM  FOBT positive. CBC significant for HGB 7.7. Informed patient that he will need to be admitted to hospital for further work up. He is in agreement with plan.     Assessment:  GIB  Hematemesis  Melena  Anemia    Plan:  Admit to family practice

## 2017-01-20 NOTE — PERIOP NOTES
[facility]  1960  [id number]    Situation:    Scheduled Procedure: Procedure(s):  ESOPHAGOGASTRODUODENOSCOPY (EGD)  Verbal report received from: Pj Patel RN  Preoperative diagnosis: Melena    Background:    Procedure: Procedure(s):  ESOPHAGOGASTRODUODENOSCOPY (EGD)  Physician performing procedure; Dr. Leonor Sharp RN    NPO Status/Last PO Intake: NPO today    Pregnancy Test:NOT APPLICABLE If yes, result: none    Is the patient taking Blood Thinners: NO   Is the patient diabetic:no          Does the patient have a Pacemaker/Defibrillator in place?: no  Does the patient need antibiotics before/during/after procedure: no   If the patient is having a colon, How much prep was drank? no   Does the patient have SCD in place:no   Is patient on CONTACT precautions:no         Assessment:  Are the vital signs stable prior to patient coming to ENDO? Yes; patient tachacardic  Is the patient alert/oriented and able to sign consent for the procedures:yes  How does the patient's abdomen feel prior to coming to ENDO? round and soft yes   Does the patient have a patient IV in place?  yes     Recommendation:  Family or Friend present yes - wife    Permission to share finding with Family or Friend yes
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
## 2017-01-20 NOTE — LETTER
NOTIFICATION RETURN TO WORK / SCHOOL 
 
1/22/2017 2:05 PM 
 
Mr. Luis Wong. [address] [place] [address] To Whom It May Concern: 
 
Luis rGewal is currently under the care of Marcy Melendez. He will need to be cleared by his primary care physician prior to resuming work. If there are questions or concerns please have the patient contact our office.  
 
 
 
Sincerely, 
 
 
 
 
Mauro Lawson MD

## 2017-01-20 NOTE — H&P
[facility]   Admission H&P    Date of admission: 1/20/2017    Patient name: Thomas Raymundo MRN: [id number]  YOB: 1960  Age: 64 y.o. Primary care provider:  Iraida Connolly MD     Source of Information: patient (self), medical records    Chief complaint:  \"throwing up and dark stools\"    History of Present Illness  Thomas Raymundo is a 64 y.o. male with a history of PUD, HTN, knee pain, who presents complaining of episodes of hematemesis and melena. Mr. Amisha Hutton says that four days ago he noticed black stools and black vomit. He says he first noticed the emesis. He says that he had not eaten around 8-9AM and then he began to feel nauseous and threw up dark coffee ground emesis. He says he had abdominal pain afterwards followed by several more episodes of vomiting and then noticed dark stools later that day. Of note, patient was started on diclofenac 75mg BID for knee pain about 2 weeks ago. He says he stopped the diclofenac that day (Monday was the last dose) because he figured it was related to his symptoms. He has been drinking lots of fluids and gatorade to try and stay hydrated. Patient says he has not had any more bowel movements since Wednesday. He says he has some reflux symptoms but no more vomiting episodes since Wednesday. Drinks a couple beers a day. Does not smoke currently, quit around 1986. ER Course:  Vitals: T - 97.9, HR - 107, BP - 116/49, RR - 20, O2 - 99% on Room Air  Labs: notable for Hb 7.7 (baseline not known).  Hematocrit 23.9  Medications:     Past Medical History   Past Medical History   Diagnosis Date    Hypertension          Past Surgical History  Past Surgical History   Procedure Laterality Date    Pr abdomen surgery proc unlisted      Hx gi      Hx hernia repair      Hx splenectomy           Family History  Family History   Problem Relation Age of Onset    Hypertension Mother     Diabetes Sister     Heart Disease Maternal Aunt     Heart Disease Maternal Uncle     Heart Disease Paternal Aunt     Heart Disease Paternal Uncle          Social History      Social history  Patient resides   Independently    x  With family     Assisted living     SNF    Ambulates  x  Independently     With cane     Assisted walker        Alcohol history    None    x 2 beers daily     Chronic    Smoking history   None    x Former smoker      Current smoker        Home Medications   Prior to Admission medications    Medication Sig Start Date End Date Taking? Authorizing Provider   diclofenac EC (VOLTAREN) 75 mg EC tablet Take 1 Tab by mouth two (2) times a day. As needed 1/6/17   Lisseth H Lobb, DO   lisinopril (PRINIVIL, ZESTRIL) 10 mg tablet Take 1 Tab by mouth daily. 1/6/17   Lisseth H Lobb, DO   amLODIPine (NORVASC) 10 mg tablet TAKE 1 TABLET BY MOUTH DAILY 9/15/16   Emely Watkins MD         Allergies   No Known Allergies    Code status  x  Full code     DNR/DNI     Partial    Code status discussed with the patient/caregivers. Review of Systems- negative unless bolded  Gen: Fevers, Chills,  Fatigue  Head: Headache, Trauma. ENT: Blurry vision, photophobia, congestion, rhinorrhea  Cardiovascular: Chest pain, Palpitations. Respiratory: Dyspnea, Cough. GI: Abdominal pain, hematochezia and melena earlier this week  : dysuria, Hematuria  MSK: Myalgias, Joint stiffness  Neuro: Numbness, Tingling, Seizures. Psych: Depression, Anxiety    Physical Exam  Visit Vitals    /61 (BP 1 Location: Right arm, BP Patient Position: At rest;Sitting)    Pulse (!) 107    Temp 97.9 °F (36.6 °C)    Resp 20    Ht 5' 10.5\" (1.791 m)    Wt 275 lb (124.7 kg)    SpO2 98%    BMI 38.9 kg/m2        Physical Exam  General: Alert, no acute distress. HENT: Normocephalic. Atraumatic. Ears normal set. Hearing grossly normal. Nares patent no nasal discharge. Neck supple. Oral mucosa dry. No oral ulcers  Eyes: EOMI. Sclera white  Lungs: LCTAB.  No wheeze/rhonci/rales  Heart: Elevated rate, normal rhythm. No murmur/rub/rugurg  Abdomen: Soft, non-tender, no rebound or guarding. +bowel soudns  Extremities: No LE edema. Neuro: CN II-XII grossly intact. A&Ox3   Skin: Warm, dry, intact. No rash  Psych: Not depressed or anxious. Laboratory Data  Recent Results (from the past 24 hour(s))   CBC WITH AUTOMATED DIFF    Collection Time: 01/20/17 10:00 AM   Result Value Ref Range    WBC 14.3 (H) 4.1 - 11.1 K/uL    RBC 2.57 (L) 4.10 - 5.70 M/uL    HGB 7.7 (L) 12.1 - 17.0 g/dL    HCT 23.9 (L) 36.6 - 50.3 %    MCV 93.0 80.0 - 99.0 FL    MCH 30.0 26.0 - 34.0 PG    MCHC 32.2 30.0 - 36.5 g/dL    RDW 17.4 (H) 11.5 - 14.5 %    PLATELET 956 787 - 763 K/uL    NEUTROPHILS 50 32 - 75 %    LYMPHOCYTES 37 12 - 49 %    MONOCYTES 10 5 - 13 %    EOSINOPHILS 2 0 - 7 %    BASOPHILS 1 0 - 1 %    ABS. NEUTROPHILS 7.3 1.8 - 8.0 K/UL    ABS. LYMPHOCYTES 5.3 (H) 0.8 - 3.5 K/UL    ABS. MONOCYTES 1.4 (H) 0.0 - 1.0 K/UL    ABS. EOSINOPHILS 0.3 0.0 - 0.4 K/UL    ABS. BASOPHILS 0.1 0.0 - 0.1 K/UL   METABOLIC PANEL, BASIC    Collection Time: 01/20/17 10:00 AM   Result Value Ref Range    Sodium 141 136 - 145 mmol/L    Potassium 3.8 3.5 - 5.1 mmol/L    Chloride 107 97 - 108 mmol/L    CO2 22 21 - 32 mmol/L    Anion gap 12 5 - 15 mmol/L    Glucose 141 (H) 65 - 100 mg/dL    BUN 64 (H) 6 - 20 MG/DL    Creatinine 1.30 0.70 - 1.30 MG/DL    BUN/Creatinine ratio 49 (H) 12 - 20      GFR est AA >60 >60 ml/min/1.73m2    GFR est non-AA 57 (L) >60 ml/min/1.73m2    Calcium 7.8 (L) 8.5 - 10.1 MG/DL   TYPE & SCREEN    Collection Time: 01/20/17 10:00 AM   Result Value Ref Range    Crossmatch Expiration 01/23/2017     ABO/Rh(D) O POSITIVE     Antibody screen NEG    OCCULT BLOOD, STOOL    Collection Time: 01/20/17 10:02 AM   Result Value Ref Range    Occult blood, stool POSITIVE (A) NEG         Assessment and Plan   Cherl Paget. is a 64 y.o. male with a history of HTN  And PUD who is admitted for GI Bleed.     GI Bleed- Patient with a history of GIB 2/2 PUD. Now with symptoms and labs concerning for GI bleed. Hematemesis and dark stools concerning for possible GI Bleed. Reassuring that no longer having stools, since blood is cathartic; however VS notable for tachycardia and labs notable for Hb of 7.7, with no previous labs for comparison  - Admitted to step down  - Nursing has established 2 IVs  - Repeat H&H now. Then H&H q4h  - Protonix 40mg IV BID  - Type and screen  - Consented for blood   - Transfuse 2u now given symptoms and unknown baseline. Post transfusion h&h  - Keep 2u ahead  - GI consulted- Dr. Oralia Egan office. Dr. Mumtaz Whalen paged  - [facility] now at 175cc/hr  Addendum: Scope scheduled for 4P per Dr. Mumtaz Whalen note    HTN - currently 116/49. Reports BPs 150s/80s outpatient. - Holding lisinopril 10mg and Norvasc 10mg at this time  - Monitor vital signs    FEN/GI - 175cc/hr Normal saline. NPO pending GI evaluation  Activity - with assistance  DVT prophylaxis - Holding. GI prophylaxis -  Protonix  Disposition - Pending evaluation.  Anticipate discharge to home    CODE STATUS:  FULL       Johnson Quiroga MD  Family Medicine Resident  PGY 2

## 2017-01-20 NOTE — PERIOP NOTES
TRANSFER - OUT REPORT:    Verbal report given to [facility] RN (name) on Andreea Stewart  being transferred to ER 21 (unit) for routine progression of care       Report consisted of patients Situation, Background, Assessment and   Recommendations(SBAR). Information from the following report(s) SBAR, Procedure Summary and Recent Results was reviewed with the receiving nurse. Lines:   Peripheral IV 01/20/17 Left Antecubital (Active)   Site Assessment Clean, dry, & intact 1/20/2017 10:12 AM   Phlebitis Assessment 0 1/20/2017 10:12 AM   Infiltration Assessment 0 1/20/2017 10:12 AM   Dressing Status Clean, dry, & intact 1/20/2017 10:12 AM   Dressing Type Transparent 1/20/2017 10:12 AM   Hub Color/Line Status Pink 1/20/2017 10:12 AM       Peripheral IV 01/20/17 Right Antecubital (Active)   Site Assessment Clean, dry, & intact 1/20/2017  3:29 PM   Phlebitis Assessment 0 1/20/2017  3:29 PM   Infiltration Assessment 0 1/20/2017  3:29 PM   Dressing Status Clean, dry, & intact 1/20/2017  3:29 PM   Dressing Type Transparent;Tape 1/20/2017  3:29 PM   Hub Color/Line Status Pink;Capped;Flushed;Patent 1/20/2017  3:29 PM   Action Taken Open ports on tubing capped 1/20/2017  3:29 PM   Alcohol Cap Used Yes 1/20/2017  3:29 PM        Opportunity for questions and clarification was provided.       Patient transported with:   Patient chart   IV lock x 2   IV infusing at 125 mL/hr on IV pum   O2 at 3 liters via NC

## 2017-01-20 NOTE — ANESTHESIA POSTPROCEDURE EVALUATION
Post-Anesthesia Evaluation and Assessment    Patient: Cody Lino MRN: [id number]  SSN: [id number]    YOB: 1960  Age: 64 y.o. Sex: male       Cardiovascular Function/Vital Signs  Visit Vitals    /74    Pulse (!) 108    Temp 36.7 °C (98 °F)    Resp 17    Ht 5' 10.5\" (1.791 m)    Wt 124.7 kg (275 lb)    SpO2 94%    BMI 38.9 kg/m2       Patient is status post general anesthesia for Procedure(s):  ESOPHAGOGASTRODUODENOSCOPY (EGD)  BICAP  INJECTION. Nausea/Vomiting: None    Postoperative hydration reviewed and adequate. Pain:  Pain Scale 1: Numeric (0 - 10) (01/20/17 1658)  Pain Intensity 1: 8 (01/20/17 1658)   Managed    Neurological Status: At baseline    Mental Status and Level of Consciousness: Arousable    Pulmonary Status:   O2 Device: Nasal cannula (01/20/17 1705)   Adequate oxygenation and airway patent    Complications related to anesthesia: None    Post-anesthesia assessment completed.  No concerns    Signed By: Nico Abernathy MD     January 20, 2017

## 2017-01-20 NOTE — PERIOP NOTES
Patient received Fentanyl, Lidocaine, Propofol, and Succinylcholine per TRACI Villar. Patient transported via stretcher to Endoscopy Recovery area.

## 2017-01-20 NOTE — PERIOP NOTES
Pt VSS, NC 3L, O2 sat 95%. C/O tender abdomen, denies difficulty breathing. Patient transferred to ED, room 21. Communicated with bedside RN that patient was back in room, Wall O2, monitors in place, patient's wife at bedside.

## 2017-01-20 NOTE — IP AVS SNAPSHOT
Current Discharge Medication List  
  
Take these medications at their scheduled times Dose & Instructions Dispensing Information Comments Morning Noon Evening Bedtime  
 docusate sodium 100 mg capsule Commonly known as:  [place] El Your next dose is: Today, Tomorrow Other:  ____________ Dose:  100 mg Take 1 Cap by mouth two (2) times a day for 90 days. Quantity:  60 Cap Refills:  0  
     
   
   
   
  
 * ferrous sulfate 325 mg (65 mg iron) tablet Your next dose is: Today, Tomorrow Other:  ____________ Dose:  325 mg Take 1 Tab by mouth daily (with breakfast). Quantity:  30 Tab Refills:  2  
     
   
   
   
  
 * ferrous sulfate 325 mg (65 mg iron) tablet Your next dose is: Today, Tomorrow Other:  ____________ Dose:  325 mg Take 1 Tab by mouth two (2) times daily (with meals). Quantity:  60 Tab Refills:  0  
     
   
   
   
  
 miSOPROStol 200 mcg tablet Commonly known as:  CYTOTEC Your next dose is: Today, Tomorrow Other:  ____________ Dose:  200 mcg Take 1 Tab by mouth every six (6) hours for 30 days. Indications: NSAID-INDUCED GASTRIC ULCER Quantity:  120 Tab Refills:  0  
     
   
   
   
  
 omeprazole 20 mg tablet Commonly known as:  PriLOSEC OTC Your next dose is: Today, Tomorrow Other:  ____________ Dose:  20 mg Take 20 mg by mouth Before breakfast, lunch, and dinner for 30 days. Indications: Duodenal Ulcer Quantity:  90 Tab Refills:  0  
     
   
   
   
  
 * Notice: This list has 2 medication(s) that are the same as other medications prescribed for you. Read the directions carefully, and ask your doctor or other care provider to review them with you. Where to Get Your Medications These medications were sent to [facility]/pharmacy #[id number] [facility] [address] [facility] [address]  [facility] [address] [facility] [place] Phone:  [phone]  
  docusate sodium 100 mg capsule  
 ferrous sulfate 325 mg (65 mg iron) tablet  
 ferrous sulfate 325 mg (65 mg iron) tablet  
 miSOPROStol 200 mcg tablet  
 omeprazole 20 mg tablet

## 2017-01-20 NOTE — IP AVS SNAPSHOT
[address] [place] [address] 
[phone] Patient: Jolene Looney MRN: [id number] GUK:[id number]/70/[birth year]90 You are allergic to the following Allergen Reactions Nsaids (Non-Steroidal Anti-Inflammatory Drug) Other (comments) Duodenal ulcers with bleeding requiring blood transfusions - 01/2017 Recent Documentation Height Weight BMI Smoking Status 1.791 m 121.5 kg 37.89 kg/m2 Former Smoker Emergency Contacts Name Discharge Info Relation Home Work Mobile Tabitha Deluna CAREGIVER [3] Spouse [3] [phone] About your hospitalization You were admitted on:  January 20, 2017 You last received care in the:  [facility] 5M1 MED SURG 1 You were discharged on:  January 23, 2017 Unit phone number:  [phone] Why you were hospitalized Your primary diagnosis was:  Gi Bleed Due To Nsaids Your diagnoses also included:  Essential Hypertension, History Of Ulcer Disease, Chronic Hepatitis C Without Hepatic Coma (Hcc), Pud (Peptic Ulcer Disease), Acute Blood Loss Anemia Providers Seen During Your Hospitalizations Provider Role Specialty Primary office phone Gurdeep Hernandez MD Attending Provider Emergency Medicine [phone] Deejay Kramer MD Attending Provider Family Practice [phone] Charlie Staley MD Attending Provider Family Practice [phone] Your Primary Care Physician (PCP) Primary Care Physician Office Phone Office Fax 4[address] [facility] [phone] Follow-up Information Follow up With Details Comments Contact Info Darryl Kaufman MD Go on 1/25/2017 Hospital follow up, Blood level check and Blood pressure check. At 10:30 AM [address] [address] [phone] Dayanara Sarah MD Go on 2/14/2017 Follow up gastrointestinal bleeding.  At 10:00 AM by at the office at 9:30 AM. Leandro Lugo [address] 
[phone] Jessy Carbajal MD Go on 2/27/2017 Follow up hepatitis. At 10:00 AM [address] 
[phone] Your Appointments Wednesday January 25, 2017 10:30 AM EST  
ESTABLISHED PATIENT with Sam Dudley MD  
[address] [facility] Mandi Mcarthur) [address] [address] [facility] [address] [phone] Monday February 27, 2017 10:00 AM EST New Patient with Jessy Carbajal MD  
[facility] (Mandi Mcarthur) [address] [place] [phone] [address]  
[phone] Current Discharge Medication List  
  
START taking these medications Dose & Instructions Dispensing Information Comments Morning Noon Evening Bedtime  
 docusate sodium 100 mg capsule Commonly known as:  Kate Nelson Your next dose is: Today, Tomorrow Other:  _________ Dose:  100 mg Take 1 Cap by mouth two (2) times a day for 90 days. Quantity:  60 Cap Refills:  0  
     
   
   
   
  
 * ferrous sulfate 325 mg (65 mg iron) tablet Your next dose is: Today, Tomorrow Other:  _________ Dose:  325 mg Take 1 Tab by mouth daily (with breakfast). Quantity:  30 Tab Refills:  2  
     
   
   
   
  
 * ferrous sulfate 325 mg (65 mg iron) tablet Your next dose is: Today, Tomorrow Other:  _________ Dose:  325 mg Take 1 Tab by mouth two (2) times daily (with meals). Quantity:  60 Tab Refills:  0  
     
   
   
   
  
 miSOPROStol 200 mcg tablet Commonly known as:  CYTOTEC Your next dose is: Today, Tomorrow Other:  _________ Dose:  200 mcg Take 1 Tab by mouth every six (6) hours for 30 days. Indications: NSAID-INDUCED GASTRIC ULCER Quantity:  120 Tab Refills:  0  
     
   
   
   
  
 omeprazole 20 mg tablet Commonly known as:  PriLOSEC OTC Your next dose is: Today, Tomorrow Other:  _________ Dose:  20 mg Take 20 mg by mouth Before breakfast, lunch, and dinner for 30 days. Indications: Duodenal Ulcer Quantity:  90 Tab Refills:  0  
     
   
   
   
  
 * Notice: This list has 2 medication(s) that are the same as other medications prescribed for you. Read the directions carefully, and ask your doctor or other care provider to review them with you. STOP taking these medications   
 amLODIPine 10 mg tablet Commonly known as:  NORVASC  
   
  
 diclofenac EC 75 mg EC tablet Commonly known as:  VOLTAREN  
   
  
 lisinopril 10 mg tablet Commonly known as:  [address] Where to Get Your Medications These medications were sent to [facility], Lem[address]  Jodee Andrade [address], [address],  [address] [place] [address] Phone:  [phone]  
  docusate sodium 100 mg capsule  
 ferrous sulfate 325 mg (65 mg iron) tablet  
 ferrous sulfate 325 mg (65 mg iron) tablet  
 miSOPROStol 200 mcg tablet  
 omeprazole 20 mg tablet Discharge Instructions HOME DISCHARGE INSTRUCTIONS Karlos Dye. / [id number] : 1960 Admission date: 2017 Discharge date: 2017 Please bring this form with you to show your care provider at your follow-up appointment. Primary care provider:  Adama Chowdhury MD 
 
Discharging provider:  Demetra Childs MD  - Family Medicine Resident Lloyd Staley MD - Attending, Family Medicine You have been admitted to the hospital with the following diagnoses: 
 
ACUTE DIAGNOSES: 
GI bleed due to NSAIDs Lacy Palacios . . . . . . . . . . . . . . . . . . . . . . . . . . . . . . . . . . . . . . . . . . . . . . . . . . . . . . . . . . . . . . . . . . . . . . Yola Palacios FOLLOW-UP CARE RECOMMENDATIONS: 
 
Your symptoms were likely caused by the medication diclofenac.  I recommend you STOP taking this medication immediately and refrain from taking that or other NSAIDs (Like ibuprofen) in the future. You may take tylenol, however, please limit this to 2000mg daily given your hepatitis. Discuss further pain control options with the Primary Care Physician. Start taking Misoprostol 200 mcg every 6 hours and Prilosec 40 mg every 8 hours. Please do not resume your blood pressure medications until up follow up with your PCP and recheck your BP. Please follow up with your Primary Care Physician your [facility] Blood Cells count. Please follow up with your PCP, Dr. Elena Santa from Gastroenterology, and Dr. Barby Ott from Hepatology Follow-up tests needed: CBC, EGD in 6 weeks for f/u of duodenal ulcers Pending test results: At the time of your discharge the following test results are still pending: None. Please make sure you review these results with your outpatient follow-up provider(s). Specific symptoms to watch for: chest pain, shortness of breath, fever (100.4 Farenheit or greater), chills, nausea, vomiting, diarrhea, change in mentation, falling, weakness, bleeding. DIET/what to eat:  Ok to resume previous diet. However, for information on bland options, which may help minimize symptoms, see below ACTIVITY:  Activity as tolerated and no driving for today Wound care: None Equipment needed:  None What to do if new or unexpected symptoms occur? If you experience any of the above symptoms (or should other concerns or questions arise after discharge) please call your primary care physician. Return to the emergency room if you cannot get hold of your doctor. · It is very important that you keep your follow-up appointment(s). · Please bring discharge papers, medication list (and/or medication bottles) to your follow-up appointments for review by your outpatient provider(s).  
· Please check the list of medications and be sure it includes every medication (even non-prescription medications) that your provider wants you to take. · It is important that you take the medication exactly as they are prescribed. · Keep your medication in the bottles provided by the pharmacist and keep a list of the medication names, dosages, and times to be taken in your wallet. · Do not take other medications without consulting your doctor. · If you have any questions about your medications or other instructions, please talk to your nurse or care provider before you leave the hospital.  
 
Information obtained by:  
 
I understand that if any problems occur once I am at home I am to contact my physician. These instructions were explained to me and I had the opportunity to ask questions. I understand and acknowledge receipt of the instructions indicated above. Physician's or R.N.'s Signature                                                                  Date/Time Patient or Representative Signature                                                          Date/Time Eating Healthy Foods: Care Instructions Your Care Instructions Eating healthy foods can help lower your risk for disease. Healthy food gives you energy and keeps your heart strong, your brain active, your muscles working, and your bones strong. A healthy diet includes a variety of foods from the basic food groups: grains, vegetables, fruits, milk and milk products, and meat and beans. Some people may eat more of their favorite foods from only one food group and, as a result, miss getting the nutrients they need.  So, it is important to pay attention not only to what you eat but also to what you are missing from your diet. You can eat a healthy, balanced diet by making a few small changes. Follow-up care is a key part of your treatment and safety. Be sure to make and go to all appointments, and call your doctor if you are having problems. Its also a good idea to know your test results and keep a list of the medicines you take. How can you care for yourself at home? Look at what you eat · Keep a food diary for a week or two and record everything you eat or drink. Track the number of servings you eat from each food group. · For a balanced diet every day, eat a variety of: ¨ 6 or more ounce-equivalents of grains, such as cereals, breads, crackers, rice, or pasta, every day. An ounce-equivalent is 1 slice of bread, 1 cup of ready-to-eat cereal, or ½ cup of cooked rice, cooked pasta, or cooked cereal. 
¨ 2½ cups of vegetables, especially: § Dark-green vegetables such as broccoli and spinach. § Orange vegetables such as carrots and sweet potatoes. § Dry beans (such as mera and kidney beans) and peas (such as lentils). ¨ 2 cups of fresh, frozen, or canned fruit. A small apple or 1 banana or orange equals 1 cup. ¨ 3 cups of nonfat or low-fat milk, yogurt, or other milk products. ¨ 5½ ounces of meat and beans, such as chicken, fish, lean meat, beans, nuts, and seeds. One egg, 1 tablespoon of peanut butter, ½ ounce nuts or seeds, or ¼ cup of cooked beans equals 1 ounce of meat. · Learn how to read food labels for serving sizes and ingredients. Fast-food and convenience-food meals often contain few or no fruits or vegetables. Make sure you eat some fruits and vegetables to make the meal more nutritious. · Look at your food diary. For each food group, add up what you have eaten and then divide the total by the number of days. This will give you an idea of how much you are eating from each food group. See if you can find some ways to change your diet to make it more healthy. Start small · Do not try to make dramatic changes to your diet all at once. You might feel that you are missing out on your favorite foods and then be more likely to fail. · Start slowly, and gradually change your habits. Try some of the following: ¨ Use whole wheat bread instead of white bread. ¨ Use nonfat or low-fat milk instead of whole milk. ¨ Eat brown rice instead of white rice, and eat whole wheat pasta instead of white-flour pasta. ¨ Try low-fat cheeses and low-fat yogurt. ¨ Add more fruits and vegetables to meals and have them for snacks. ¨ Add lettuce, tomato, cucumber, and onion to sandwiches. ¨ Add fruit to yogurt and cereal. 
Enjoy food · You can still eat your favorite foods. You just may need to eat less of them. If your favorite foods are high in fat, salt, and sugar, limit how often you eat them, but do not cut them out entirely. · Eat a wide variety of foods. Make healthy choices when eating out · The type of restaurant you choose can help you make healthy choices. Even fast-food chains are now offering more low-fat or healthier choices on the menu. · Choose smaller portions, or take half of your meal home. · When eating out, try: ¨ A veggie pizza with a whole wheat crust or grilled chicken (instead of sausage or pepperoni). ¨ Pasta with roasted vegetables, grilled chicken, or marinara sauce instead of cream sauce. ¨ A vegetable wrap or grilled chicken wrap. ¨ Broiled or poached food instead of fried or breaded items. Make healthy choices easy · Buy packaged, prewashed, ready-to-eat fresh vegetables and fruits, such as baby carrots, salad mixes, and chopped or shredded broccoli and cauliflower. · Buy packaged, presliced fruits, such as melon or pineapple. · Choose 100% fruit or vegetable juice instead of soda. Limit juice intake to 4 to 6 oz (½ to ¾ cup) a day. · Blend low-fat yogurt, fruit juice, and canned or frozen fruit to make a smoothie for breakfast or a snack. Where can you learn more? Go to http://radha-miranda.info/. Enter T756 in the search box to learn more about \"Eating Healthy Foods: Care Instructions. \" Current as of: November 20, 2015 Content Version: 11.1 © 3916-3588 Graze. Care instructions adapted under license by [employer] (which disclaims liability or warranty for this information). If you have questions about a medical condition or this instruction, always ask your healthcare professional. [place] [address] any warranty or liability for your use of this information. Starting a Weight Loss Plan: Care Instructions Your Care Instructions If you are thinking about losing weight, it can be hard to know where to start. Your doctor can help you set up a weight loss plan that best meets your needs. You may want to take a class on nutrition or exercise, or join a weight loss support group. If you have questions about how to make changes to your eating or exercise habits, ask your doctor about seeing a registered dietitian or an exercise specialist. 
It can be a big challenge to lose weight. But you do not have to make huge changes at once. Make small changes, and stick with them. When those changes become habit, add a few more changes. If you do not think you are ready to make changes right now, try to pick a date in the future. Make an appointment to see your doctor to discuss whether the time is right for you to start a plan. Follow-up care is a key part of your treatment and safety. Be sure to make and go to all appointments, and call your doctor if you are having problems. Its also a good idea to know your test results and keep a list of the medicines you take. How can you care for yourself at home? · Set realistic goals. Many people expect to lose much more weight than is likely. A weight loss of 5% to 10% of your body weight may be enough to improve your health. · Get family and friends involved to provide support. Talk to them about why you are trying to lose weight, and ask them to help. They can help by participating in exercise and having meals with you, even if they may be eating something different. · Find what works best for you. If you do not have time or do not like to cook, a program that offers meal replacement bars or shakes may be better for you. Or if you like to prepare meals, finding a plan that includes daily menus and recipes may be best. 
· Ask your doctor about other health professionals who can help you achieve your weight loss goals. ¨ A dietitian can help you make healthy changes in your diet. ¨ An exercise specialist or  can help you develop a safe and effective exercise program. 
¨ A counselor or psychiatrist can help you cope with issues such as depression, anxiety, or family problems that can make it hard to focus on weight loss. · Consider joining a support group for people who are trying to lose weight. Your doctor can suggest groups in your area. Where can you learn more? Go to http://radha-miranda.info/. Enter E934 in the search box to learn more about \"Starting a Weight Loss Plan: Care Instructions. \" Current as of: February 16, 2016 Content Version: 11.1 © 7478-8606 [employer]. Care instructions adapted under license by [employer] (which disclaims liability or warranty for this information). If you have questions about a medical condition or this instruction, always ask your healthcare professional. [place] [address] any warranty or liability for your use of this information. Discharge Orders None [facility] Announcement We are excited to announce that we are making your provider's discharge notes available to you in Blink.   You will see these notes when they are completed and signed by the physician that discharged you from your recent hospital stay. If you have any questions or concerns about any information you see in Wavestream, please call the Health Information Department where you were seen or reach out to your Primary Care Provider for more information about your plan of care. Introducing [facility]! Dear Pancho Gusman: Thank you for requesting a Wavestream account. Our records indicate that you already have an active Wavestream account. You can access your account anytime at [email or web address] Did you know that you can access your hospital and ER discharge instructions at any time in Wavestream? You can also review all of your test results from your hospital stay or ER visit. Additional Information If you have questions, please visit the Frequently Asked Questions section of the Wavestream website at [email or web address] Remember, Wavestream is NOT to be used for urgent needs. For medical emergencies, dial 911. Now available from your iPhone and Android! General Information Please provide this summary of care documentation to your next provider. Patient Signature:  ____________________________________________________________ Date:  ____________________________________________________________  
  
[place] Provider Signature:  ____________________________________________________________ Date:  ____________________________________________________________

## 2017-01-20 NOTE — PROGRESS NOTES
2017   CARE MANAGEMENT NOTE:  CM is following pt in the ER for initial discharge planning. EMR reviewed. CM met with pt who was his own historian for this needs assessment. Reportedly, pt resides alone at [facility]. [facility]. His wife is currently residing in [place], [place] (reportedly, a relative  and they acquired an extra home thus the living arrangements). PTA, pt was amubulatory, indepn with ADLs, and drives. He is employed and uses [facility] on [address]  Pt does not have home healthcare nor DME for home use. PCP is Dr. Gemini Colorado but has seen Dr. Jorge Abarca. Plan is to return home when medically stable.   [place]

## 2017-01-20 NOTE — PROCEDURES
[address] [facility] THOR Sanon MD  [phone]      2017    Esophagogastroduodenoscopy (EGD) Procedure Note  Karen Mederos. : 1960  Ynes Felix Medical Record Number: [id number]      Indications:    Abdominal pain, epigastric, Hematemesis, Melena/hematochezia, Vomiting, persitent of unclear etilogy  Referring Physician:  Emely Watkins MD  Anesthesia/Sedation: Conscious Sedation/Moderate Sedation  Endoscopist:  Dr. Kathia Meyer  Complications:  None  Estimated Blood Loss:  None    Permit:  The indications, risks, benefits and alternatives were reviewed with the patient or their decision maker who was provided an opportunity to ask questions and all questions were answered. The specific risks of esophagogastroduodenoscopy with conscious sedation were reviewed, including but not limited to anesthetic complication, bleeding, adverse drug reaction, missed lesion, infection, IV site reactions, and intestinal perforation which would lead to the need for surgical repair. Alternatives to EGD including radiographic imaging, observation without testing, or laboratory testing were reviewed as well as the limitations of those alternatives discussed. After considering the options and having all their questions answered, the patient or their decision maker provided both verbal and written consent to proceed. Procedure in Detail:  After obtaining informed consent, positioning of the patient in the left lateral decubitus position, and conduction of a pre-procedure pause or \"time out\" the endoscope was introduced into the mouth and advanced to the duodenum. A careful inspection was made, and findings or interventions are described below. Findings:   Esophagus:normal  Stomach: No blood in stomach    Duodenum/jejunum: Multiple ulcers of the duodenum are noted.   One of which had a non-bleeding visible vessel. I injected 4 1cc aliquots of 1:68976 epinephrine and then used the heater probe to provide cautery atop the NBVV so as to give better change of durable hemostasis. Specimens: none    Impression: PUD, with findings of high risk for rebleeding so hemostatic therapy applied. Recommendations:  -NPO tonight  -clears OK tomorrow if stable  -IV PPI infusion 48-72 hours  -No NSIADs  -Serial H/H and transfuse if hgb <7    Thank you for entrusting me with this patient's care. Please do not hesitate to contact me with any questions or if I can be of assistance with any of your other patients' GI needs.   Signed By: Alex Toure MD                        January 20, 2017

## 2017-01-20 NOTE — ED NOTES
Report given to Srinivasa Lai in endoscopy. They will come and get the patient around 1500 to 1530, will update the patient.

## 2017-01-20 NOTE — PERIOP NOTES
TRANSFER - IN REPORT:    Verbal report received from Cassandra Sullivan RN(name) on [employer]  being received from endoscopy(unit) for routine post - op      Report consisted of patients Situation, Background, Assessment and   Recommendations(SBAR). Information from the following report(s) SBAR, OR Summary, Procedure Summary and MAR was reviewed with the receiving nurse. Opportunity for questions and clarification was provided. Assessment completed upon patients arrival to unit and care assumed.

## 2017-01-20 NOTE — ANESTHESIA PREPROCEDURE EVALUATION
Anesthetic History   No history of anesthetic complications            Review of Systems / Medical History  Patient summary reviewed and pertinent labs reviewed    Pulmonary  Within defined limits                 Neuro/Psych   Within defined limits           Cardiovascular    Hypertension              Exercise tolerance: >4 METS     GI/Hepatic/Renal       Hepatitis: type C    Liver disease     Endo/Other        Obesity and anemia     Other Findings              Physical Exam    Airway  Mallampati: II  TM Distance: 4 - 6 cm  Neck ROM: normal range of motion   Mouth opening: Normal     Cardiovascular  Regular rate and rhythm,  S1 and S2 normal,  no murmur, click, rub, or gallop  Rhythm: regular  Rate: normal         Dental    Dentition: Caps/crowns and Poor dentition     Pulmonary  Breath sounds clear to auscultation               Abdominal  GI exam deferred       Other Findings            Anesthetic Plan    ASA: 3  Anesthesia type: general          Induction: Intravenous and RSI  Anesthetic plan and risks discussed with: Patient

## 2017-01-21 LAB
ABO + RH BLD: NORMAL
ANION GAP BLD CALC-SCNC: 10 MMOL/L (ref 5–15)
BLD PROD TYP BPU: NORMAL
BLD PROD TYP BPU: NORMAL
BLOOD GROUP ANTIBODIES SERPL: NORMAL
BPU ID: NORMAL
BPU ID: NORMAL
BUN SERPL-MCNC: 40 MG/DL (ref 6–20)
BUN/CREAT SERPL: 39 (ref 12–20)
CALCIUM SERPL-MCNC: 7.6 MG/DL (ref 8.5–10.1)
CHLORIDE SERPL-SCNC: 115 MMOL/L (ref 97–108)
CO2 SERPL-SCNC: 22 MMOL/L (ref 21–32)
CREAT SERPL-MCNC: 1.02 MG/DL (ref 0.7–1.3)
CROSSMATCH RESULT,%XM: NORMAL
CROSSMATCH RESULT,%XM: NORMAL
ERYTHROCYTE [DISTWIDTH] IN BLOOD BY AUTOMATED COUNT: 17 % (ref 11.5–14.5)
GLUCOSE SERPL-MCNC: 96 MG/DL (ref 65–100)
HCT VFR BLD AUTO: 25.2 % (ref 36.6–50.3)
HCT VFR BLD AUTO: 25.9 % (ref 36.6–50.3)
HCT VFR BLD AUTO: 26 % (ref 36.6–50.3)
HGB BLD-MCNC: 8.1 G/DL (ref 12.1–17)
HGB BLD-MCNC: 8.3 G/DL (ref 12.1–17)
HGB BLD-MCNC: 8.7 G/DL (ref 12.1–17)
MCH RBC QN AUTO: 30 PG (ref 26–34)
MCHC RBC AUTO-ENTMCNC: 32.1 G/DL (ref 30–36.5)
MCV RBC AUTO: 93.3 FL (ref 80–99)
PLATELET # BLD AUTO: 323 K/UL (ref 150–400)
POTASSIUM SERPL-SCNC: 3.6 MMOL/L (ref 3.5–5.1)
RBC # BLD AUTO: 2.7 M/UL (ref 4.1–5.7)
SODIUM SERPL-SCNC: 147 MMOL/L (ref 136–145)
SPECIMEN EXP DATE BLD: NORMAL
STATUS OF UNIT,%ST: NORMAL
STATUS OF UNIT,%ST: NORMAL
UNIT DIVISION, %UDIV: 0
UNIT DIVISION, %UDIV: 0
WBC # BLD AUTO: 18.2 K/UL (ref 4.1–11.1)

## 2017-01-21 PROCEDURE — C9113 INJ PANTOPRAZOLE SODIUM, VIA: HCPCS | Performed by: FAMILY MEDICINE

## 2017-01-21 PROCEDURE — 74011250637 HC RX REV CODE- 250/637: Performed by: FAMILY MEDICINE

## 2017-01-21 PROCEDURE — 74011250636 HC RX REV CODE- 250/636: Performed by: FAMILY MEDICINE

## 2017-01-21 PROCEDURE — 80048 BASIC METABOLIC PNL TOTAL CA: CPT | Performed by: FAMILY MEDICINE

## 2017-01-21 PROCEDURE — 85018 HEMOGLOBIN: CPT | Performed by: FAMILY MEDICINE

## 2017-01-21 PROCEDURE — 74011000250 HC RX REV CODE- 250: Performed by: FAMILY MEDICINE

## 2017-01-21 PROCEDURE — 36415 COLL VENOUS BLD VENIPUNCTURE: CPT | Performed by: FAMILY MEDICINE

## 2017-01-21 PROCEDURE — 74011250637 HC RX REV CODE- 250/637: Performed by: INTERNAL MEDICINE

## 2017-01-21 PROCEDURE — 65660000000 HC RM CCU STEPDOWN

## 2017-01-21 PROCEDURE — 85027 COMPLETE CBC AUTOMATED: CPT | Performed by: FAMILY MEDICINE

## 2017-01-21 RX ORDER — LIDOCAINE 50 MG/G
1 PATCH TOPICAL EVERY 24 HOURS
Status: DISCONTINUED | OUTPATIENT
Start: 2017-01-21 | End: 2017-01-23 | Stop reason: HOSPADM

## 2017-01-21 RX ORDER — MISOPROSTOL 200 UG/1
200 TABLET ORAL EVERY 6 HOURS
Status: DISCONTINUED | OUTPATIENT
Start: 2017-01-21 | End: 2017-01-23 | Stop reason: HOSPADM

## 2017-01-21 RX ADMIN — SODIUM CHLORIDE 40 MG: 9 INJECTION INTRAMUSCULAR; INTRAVENOUS; SUBCUTANEOUS at 20:17

## 2017-01-21 RX ADMIN — Medication 10 ML: at 20:17

## 2017-01-21 RX ADMIN — Medication 10 ML: at 06:29

## 2017-01-21 RX ADMIN — MISOPROSTOL 200 MCG: 200 TABLET ORAL at 17:09

## 2017-01-21 RX ADMIN — MISOPROSTOL 200 MCG: 200 TABLET ORAL at 12:41

## 2017-01-21 RX ADMIN — SODIUM CHLORIDE 40 MG: 9 INJECTION INTRAMUSCULAR; INTRAVENOUS; SUBCUTANEOUS at 08:29

## 2017-01-21 NOTE — PROGRESS NOTES
Gastrointestinal Progress Note    1/21/2017    Admit Date: 1/20/2017    Subjective:  pain     New Complaints Today:  No: no abdominal pain, bowel movements, vomiting. Only pains are joint pains that required diclofenac resulting current hospital stay    Bleeding:  None    Current Facility-Administered Medications   Medication Dose Route Frequency    lidocaine (LIDODERM) 5 % patch 1 Patch  1 Patch TransDERmal Q24H    sodium chloride (NS) flush 5-10 mL  5-10 mL IntraVENous Q8H    sodium chloride (NS) flush 5-10 mL  5-10 mL IntraVENous PRN    pantoprazole (PROTONIX) 40 mg in sodium chloride 0.9 % 10 mL injection  40 mg IntraVENous Q12H    0.9% sodium chloride infusion 250 mL  250 mL IntraVENous PRN        Objective:     Blood pressure 107/58, pulse 90, temperature 98 °F (36.7 °C), resp. rate 16, height 5' 10.5\" (1.791 m), weight 121.5 kg (267 lb 13.7 oz), SpO2 97 %.     01/21 0701 - 01/21 1900  In: -   Out: 800 [Urine:800]    01/19 1901 - 01/21 0700  In: -   Out: 1000 [Urine:1000]    EXAM:  GENERAL: alert, cooperative, no distress, HEART: regular rate and rhythm, LUNGS: chest clear, no wheezing, rales, normal symmetric air entry, ABDOMEN:  Bowel sounds are normal, liver is not enlarged, spleen is not enlarged and EXTREMITY: no edema      Data Review    Recent Results (from the past 24 hour(s))   HGB & HCT    Collection Time: 01/20/17 12:34 PM   Result Value Ref Range    HGB 7.2 (L) 12.1 - 17.0 g/dL    HCT 21.9 (L) 36.6 - 50.3 %   HGB & HCT    Collection Time: 01/20/17 11:00 PM   Result Value Ref Range    HGB 8.6 (L) 12.1 - 17.0 g/dL    HCT 25.7 (L) 36.6 - 50.3 %   CBC W/O DIFF    Collection Time: 01/21/17  4:47 AM   Result Value Ref Range    WBC 18.2 (H) 4.1 - 11.1 K/uL    RBC 2.70 (L) 4.10 - 5.70 M/uL    HGB 8.1 (L) 12.1 - 17.0 g/dL    HCT 25.2 (L) 36.6 - 50.3 %    MCV 93.3 80.0 - 99.0 FL    MCH 30.0 26.0 - 34.0 PG    MCHC 32.1 30.0 - 36.5 g/dL    RDW 17.0 (H) 11.5 - 14.5 %    PLATELET 265 735 - 551 K/uL METABOLIC PANEL, BASIC    Collection Time: 01/21/17  4:47 AM   Result Value Ref Range    Sodium 147 (H) 136 - 145 mmol/L    Potassium 3.6 3.5 - 5.1 mmol/L    Chloride 115 (H) 97 - 108 mmol/L    CO2 22 21 - 32 mmol/L    Anion gap 10 5 - 15 mmol/L    Glucose 96 65 - 100 mg/dL    BUN 40 (H) 6 - 20 MG/DL    Creatinine 1.02 0.70 - 1.30 MG/DL    BUN/Creatinine ratio 39 (H) 12 - 20      GFR est AA >60 >60 ml/min/1.73m2    GFR est non-AA >60 >60 ml/min/1.73m2    Calcium 7.6 (L) 8.5 - 10.1 MG/DL   HGB & HCT    Collection Time: 01/21/17 10:31 AM   Result Value Ref Range    HGB 8.3 (L) 12.1 - 17.0 g/dL    HCT 26.0 (L) 36.6 - 50.3 %       Assessment:     Principal Problem:    GI bleed due to NSAIDs (1/20/2017)    Active Problems:    Essential hypertension (11/9/2015)      Chronic hepatitis C without hepatic coma ([facility] [address] (12/14/2015)      History of ulcer disease (1/20/2017)        Plan:     1.   AM labs  2.  misoprostol

## 2017-01-21 NOTE — ROUTINE PROCESS
TRANSFER - OUT REPORT:    Verbal report given to Donna Johnson RN (name) on [facility].  being transferred to [facility] (unit) for routine progression of care       Report consisted of patients Situation, Background, Assessment and   Recommendations(SBAR). Information from the following report(s) SBAR, ED Summary, Intake/Output, MAR, Recent Results and Cardiac Rhythm ST was reviewed with the receiving nurse. Lines:   Peripheral IV 01/20/17 Left Antecubital (Active)   Site Assessment Clean, dry, & intact 1/20/2017 10:12 AM   Phlebitis Assessment 0 1/20/2017 10:12 AM   Infiltration Assessment 0 1/20/2017 10:12 AM   Dressing Status Clean, dry, & intact 1/20/2017 10:12 AM   Dressing Type Transparent 1/20/2017 10:12 AM   Hub Color/Line Status Pink 1/20/2017 10:12 AM       Peripheral IV 01/20/17 Right Antecubital (Active)   Site Assessment Clean, dry, & intact 1/20/2017  3:29 PM   Phlebitis Assessment 0 1/20/2017  3:29 PM   Infiltration Assessment 0 1/20/2017  3:29 PM   Dressing Status Clean, dry, & intact 1/20/2017  3:29 PM   Dressing Type Transparent;Tape 1/20/2017  3:29 PM   Hub Color/Line Status Pink;Capped;Flushed;Patent 1/20/2017  3:29 PM   Action Taken Open ports on tubing capped 1/20/2017  3:29 PM   Alcohol Cap Used Yes 1/20/2017  3:29 PM       Peripheral IV 01/20/17 Right Hand (Active)   Site Assessment Clean, dry, & intact 1/20/2017  6:00 PM   Phlebitis Assessment 0 1/20/2017  6:00 PM   Infiltration Assessment 0 1/20/2017  6:00 PM   Dressing Status Clean, dry, & intact 1/20/2017  6:00 PM   Dressing Type Non-adherent dressing 1/20/2017  6:00 PM   Hub Color/Line Status Pink 1/20/2017  6:00 PM        Opportunity for questions and clarification was provided.       Patient transported with:   Registered Nurse

## 2017-01-21 NOTE — PROGRESS NOTES
Family Medicine Resident Progress Note    Daily Progress Note: 1/21/2017     CC: \"I've been dreaming about mini\"    Assessment/Plan:   Bryant Mantilla is a 64 y.o. male with a history of HTN And PUD who is admitted for GI Bleed.     GI Bleed secondary to multiple duodenal ulcers and NSAID use- Patient with a history of GIB 2/2 PUD presented with hematemesis and melena. EGD performed yesterday identifying multplie duodenal ulcers and had 1 vessel injected with epinephrine and cauterization. - GI consulted- Dr. Oralia Egan office  - 2 large bore PIVs established. - Protonix gtt for 48-72 hrs per GI recommendation.   - Consented for blood. S/P 2uints of pRBCs with 2 units on hold. Transfuse for Hgb < 7.   - Monitor Hgb [id number]. - MIVF now at 175cc/hr     HTN - Pt currently hypotensive-low normotensive.   - Holding PTA lisinopril 10mg and Norvasc 10mg at this time  - Monitor vital signs. May resume medication if patient becomes hypertensive. Hepatitis C Patient had elevated LTFs in 11/2015 and at the beginning of the [address] [place] [address] Avenue expressed to his PCP that he was concerned that he had hepatitis C. Genotype was positive for  Type 1a and referred to Dr. Naomie Dale, hepatology.       FEN/GI - 175cc/hr Normal saline. Start clears later today if remains stable. Activity - with assistance  DVT prophylaxis - Holding. GI prophylaxis - Protonix gtt. Disposition - Pending evaluation. Anticipate discharge to home     CODE STATUS:  FULL    Patient discussed with Dr. William Craig. Lakeshia Durant MD  Family Medicine Resident         Subjective  No acute events overnight. Patient . Denies chills, headaches, chest pain, shortness of breath, palpitations, abdominal pain, nausea and vomiting, and LE edema. Has been NPO. No bowel movements in the last 24 hours.   .      Inpatient Medications  Current Facility-Administered Medications   Medication Dose Route Frequency    sodium chloride (NS) flush 5-10 mL  5-10 mL IntraVENous Q8H  sodium chloride (NS) flush 5-10 mL  5-10 mL IntraVENous PRN    pantoprazole (PROTONIX) 40 mg in sodium chloride 0.9 % 10 mL injection  40 mg IntraVENous Q12H    0.9% sodium chloride infusion 250 mL  250 mL IntraVENous PRN         Allergies  No Known Allergies      Objective  Vitals:  Patient Vitals for the past 8 hrs:   Temp Pulse Resp BP SpO2   01/21/17 0435 98.3 °F (36.8 °C) 91 17 (!) 108/33 95 %   01/20/17 2309 98.4 °F (36.9 °C) (!) 103 19 112/52 97 %   01/20/17 2218 - (!) 101 - - -         I/O:    Intake/Output Summary (Last 24 hours) at 01/21/17 0610  Last data filed at 01/21/17 0027   Gross per 24 hour   Intake                0 ml   Output              500 ml   Net             -500 ml     Last shift:    01/20 1901 - 01/21 0700  In: -   Out: 500 [Urine:500]  Last 3 shifts:         Physical Exam:  General: No acute distress. Alert. Cooperative. Head: Normocephalic. Atraumatic. Eyes:  Conjunctiva pink. Sclera white. PERRL   Nose:  Septum midline. Mucosa pink. No drainage. Throat: Mucosa pink. Moist mucous membranes. No tonsillar exudates or erythema. Palate movement equal bilaterally. Respiratory: CTAB. No w/r/r/c.   Cardiovascular: RRR. Normal S1,S2. No m/r/g. Pulses 2+ throughout. GI: + bowel sounds. Nontender. No rebound tenderness or guarding. Nondistended   Extremities: No edema. No palpable cord. No tenderness.      Laboratory Data  Recent Results (from the past 12 hour(s))   HGB & HCT    Collection Time: 01/20/17 11:00 PM   Result Value Ref Range    HGB 8.6 (L) 12.1 - 17.0 g/dL    HCT 25.7 (L) 36.6 - 50.3 %   CBC W/O DIFF    Collection Time: 01/21/17  4:47 AM   Result Value Ref Range    WBC 18.2 (H) 4.1 - 11.1 K/uL    RBC 2.70 (L) 4.10 - 5.70 M/uL    HGB 8.1 (L) 12.1 - 17.0 g/dL    HCT 25.2 (L) 36.6 - 50.3 %    MCV 93.3 80.0 - 99.0 FL    MCH 30.0 26.0 - 34.0 PG    MCHC 32.1 30.0 - 36.5 g/dL    RDW 17.0 (H) 11.5 - 14.5 %    PLATELET 074 028 - 024 K/uL   METABOLIC PANEL, BASIC    Collection Time: 01/21/17  4:47 AM   Result Value Ref Range    Sodium 147 (H) 136 - 145 mmol/L    Potassium 3.6 3.5 - 5.1 mmol/L    Chloride 115 (H) 97 - 108 mmol/L    CO2 22 21 - 32 mmol/L    Anion gap 10 5 - 15 mmol/L    Glucose 96 65 - 100 mg/dL    BUN 40 (H) 6 - 20 MG/DL    Creatinine 1.02 0.70 - 1.30 MG/DL    BUN/Creatinine ratio 39 (H) 12 - 20      GFR est AA >60 >60 ml/min/1.73m2    GFR est non-AA >60 >60 ml/min/1.73m2    Calcium 7.6 (L) 8.5 - 10.1 MG/DL     Hospital Problems  Date Reviewed: 1/20/2017          Codes Class Noted POA    * (Principal)GI bleed due to NSAIDs ICD-10-CM: K92.2, T39.395A  ICD-9-CM: 578.9, E935.8  1/20/2017 Unknown        History of ulcer disease ICD-10-CM: Z87.898  ICD-9-CM: V13.89  1/20/2017 Unknown        Chronic hepatitis C without hepatic coma (HCC) ICD-10-CM: B18.2  ICD-9-CM: 070.54  12/14/2015 Yes        Essential hypertension ICD-10-CM: I10  ICD-9-CM: 401.9  11/9/2015 Yes

## 2017-01-22 PROBLEM — D62 ACUTE BLOOD LOSS ANEMIA: Status: ACTIVE | Noted: 2017-01-22

## 2017-01-22 PROBLEM — K27.9 PUD (PEPTIC ULCER DISEASE): Status: ACTIVE | Noted: 2017-01-22

## 2017-01-22 LAB
ANION GAP BLD CALC-SCNC: 8 MMOL/L (ref 5–15)
BASOPHILS # BLD AUTO: 0 K/UL (ref 0–0.1)
BASOPHILS # BLD: 0 % (ref 0–1)
BUN SERPL-MCNC: 26 MG/DL (ref 6–20)
BUN/CREAT SERPL: 23 (ref 12–20)
CALCIUM SERPL-MCNC: 7.4 MG/DL (ref 8.5–10.1)
CHLORIDE SERPL-SCNC: 108 MMOL/L (ref 97–108)
CO2 SERPL-SCNC: 23 MMOL/L (ref 21–32)
CREAT SERPL-MCNC: 1.12 MG/DL (ref 0.7–1.3)
DIFFERENTIAL METHOD BLD: ABNORMAL
EOSINOPHIL # BLD: 0.9 K/UL (ref 0–0.4)
EOSINOPHIL NFR BLD: 5 % (ref 0–7)
ERYTHROCYTE [DISTWIDTH] IN BLOOD BY AUTOMATED COUNT: 16.7 % (ref 11.5–14.5)
GLUCOSE SERPL-MCNC: 106 MG/DL (ref 65–100)
HCT VFR BLD AUTO: 24.6 % (ref 36.6–50.3)
HCT VFR BLD AUTO: 25.6 % (ref 36.6–50.3)
HGB BLD-MCNC: 8.1 G/DL (ref 12.1–17)
HGB BLD-MCNC: 8.4 G/DL (ref 12.1–17)
LYMPHOCYTES # BLD AUTO: 52 % (ref 12–49)
LYMPHOCYTES # BLD: 8.8 K/UL (ref 0.8–3.5)
MCH RBC QN AUTO: 30.3 PG (ref 26–34)
MCHC RBC AUTO-ENTMCNC: 32.9 G/DL (ref 30–36.5)
MCV RBC AUTO: 92.1 FL (ref 80–99)
MONOCYTES # BLD: 1.2 K/UL (ref 0–1)
MONOCYTES NFR BLD AUTO: 7 % (ref 5–13)
NEUTS SEG # BLD: 6.2 K/UL (ref 1.8–8)
NEUTS SEG NFR BLD AUTO: 36 % (ref 32–75)
NRBC # BLD: 0 K/UL (ref 0–0.01)
NRBC BLD-RTO: 0 PER 100 WBC
NRBC BLD-RTO: 1 PER 100 WBC
PLATELET # BLD AUTO: 367 K/UL (ref 150–400)
POTASSIUM SERPL-SCNC: 3.8 MMOL/L (ref 3.5–5.1)
RBC # BLD AUTO: 2.67 M/UL (ref 4.1–5.7)
RBC MORPH BLD: ABNORMAL
SODIUM SERPL-SCNC: 139 MMOL/L (ref 136–145)
WBC # BLD AUTO: 17.1 K/UL (ref 4.1–11.1)

## 2017-01-22 PROCEDURE — 85025 COMPLETE CBC W/AUTO DIFF WBC: CPT | Performed by: FAMILY MEDICINE

## 2017-01-22 PROCEDURE — 74011250636 HC RX REV CODE- 250/636: Performed by: FAMILY MEDICINE

## 2017-01-22 PROCEDURE — 36415 COLL VENOUS BLD VENIPUNCTURE: CPT | Performed by: FAMILY MEDICINE

## 2017-01-22 PROCEDURE — 74011000250 HC RX REV CODE- 250: Performed by: FAMILY MEDICINE

## 2017-01-22 PROCEDURE — 74011250637 HC RX REV CODE- 250/637: Performed by: INTERNAL MEDICINE

## 2017-01-22 PROCEDURE — 85018 HEMOGLOBIN: CPT | Performed by: FAMILY MEDICINE

## 2017-01-22 PROCEDURE — 80048 BASIC METABOLIC PNL TOTAL CA: CPT | Performed by: FAMILY MEDICINE

## 2017-01-22 PROCEDURE — 65270000029 HC RM PRIVATE

## 2017-01-22 PROCEDURE — 74011250637 HC RX REV CODE- 250/637: Performed by: FAMILY MEDICINE

## 2017-01-22 PROCEDURE — C9113 INJ PANTOPRAZOLE SODIUM, VIA: HCPCS | Performed by: FAMILY MEDICINE

## 2017-01-22 RX ORDER — LANOLIN ALCOHOL/MO/W.PET/CERES
325 CREAM (GRAM) TOPICAL 2 TIMES DAILY WITH MEALS
Qty: 60 TAB | Refills: 0 | Status: SHIPPED | OUTPATIENT
Start: 2017-01-22 | End: 2017-01-23

## 2017-01-22 RX ORDER — LANOLIN ALCOHOL/MO/W.PET/CERES
325 CREAM (GRAM) TOPICAL
Qty: 30 TAB | Refills: 2 | Status: SHIPPED | OUTPATIENT
Start: 2017-01-23 | End: 2017-01-23

## 2017-01-22 RX ORDER — MISOPROSTOL 200 UG/1
200 TABLET ORAL EVERY 6 HOURS
Qty: 120 TAB | Refills: 0 | Status: SHIPPED | OUTPATIENT
Start: 2017-01-22 | End: 2017-01-23

## 2017-01-22 RX ORDER — LANOLIN ALCOHOL/MO/W.PET/CERES
1 CREAM (GRAM) TOPICAL
Status: DISCONTINUED | OUTPATIENT
Start: 2017-01-23 | End: 2017-01-23 | Stop reason: HOSPADM

## 2017-01-22 RX ORDER — OMEPRAZOLE 20 MG/1
20 TABLET, DELAYED RELEASE ORAL
Qty: 90 TAB | Refills: 0 | Status: SHIPPED | OUTPATIENT
Start: 2017-01-22 | End: 2017-01-23

## 2017-01-22 RX ORDER — DOCUSATE SODIUM 100 MG/1
100 CAPSULE, LIQUID FILLED ORAL 2 TIMES DAILY
Qty: 60 CAP | Refills: 0 | Status: SHIPPED | OUTPATIENT
Start: 2017-01-22 | End: 2017-01-23

## 2017-01-22 RX ORDER — PANTOPRAZOLE SODIUM 40 MG/1
40 TABLET, DELAYED RELEASE ORAL
Status: DISCONTINUED | OUTPATIENT
Start: 2017-01-22 | End: 2017-01-23 | Stop reason: HOSPADM

## 2017-01-22 RX ADMIN — MISOPROSTOL 200 MCG: 200 TABLET ORAL at 00:34

## 2017-01-22 RX ADMIN — SODIUM CHLORIDE 40 MG: 9 INJECTION INTRAMUSCULAR; INTRAVENOUS; SUBCUTANEOUS at 08:00

## 2017-01-22 RX ADMIN — Medication 10 ML: at 23:26

## 2017-01-22 RX ADMIN — MISOPROSTOL 200 MCG: 200 TABLET ORAL at 11:24

## 2017-01-22 RX ADMIN — MISOPROSTOL 200 MCG: 200 TABLET ORAL at 06:26

## 2017-01-22 RX ADMIN — MISOPROSTOL 200 MCG: 200 TABLET ORAL at 23:25

## 2017-01-22 RX ADMIN — PANTOPRAZOLE SODIUM 40 MG: 40 TABLET, DELAYED RELEASE ORAL at 17:07

## 2017-01-22 RX ADMIN — MISOPROSTOL 200 MCG: 200 TABLET ORAL at 17:07

## 2017-01-22 NOTE — PROGRESS NOTES
Family Medicine Resident Progress Note    Daily Progress Note: 1/21/2017     CC: \"I've been dreaming about mini\"    Assessment/Plan:   Karen Marley is a 64 y.o. male with a history of HTN And PUD who is admitted for GI Bleed.     GI Bleed secondary to multiple duodenal ulcers and NSAID use- Patient with a history of GIB 2/2 PUD presented with hematemesis and melena. EGD performed yesterday identifying multplie duodenal ulcers and had 1 vessel injected with epinephrine and cauterization. Hag has been stable over 24 hours. - GI consulted- Dr. Ronen Harmon office  - 2 large bore PIVs established. - Protonix gtt for 48-72 hrs per GI recommendation.   - Added misoprostol yesterday  - Consented for blood. S/P 2 units of pRBCs with 2 units on hold. Transfuse for Hgb < 7.   - Monitor Hgb f7thyep.      HTN - Pt currently hypotensive-low normotensive.   - Holding PTA lisinopril 10mg and Norvasc 10mg at this time  - Monitor vital signs. May resume medication if patient becomes hypertensive. Hepatitis C Patient had elevated LTFs in 11/2015 and at the beginning of the [address] [place] [address] Avenue expressed to his PCP that he was concerned that he had hepatitis C. Genotype was positive for  Type 1a and referred to Dr. Kacie Nelson, hepatology.       FEN/GI - GI lite diet. Activity - with assistance  DVT prophylaxis - Holding. GI prophylaxis - Protonix IV  Disposition - Anticipate discharge to home     CODE STATUS:  FULL    Patient discussed with Dr. Dasia Casillas. Sara Vasquez MD  Family Medicine Resident         Subjective  No acute events overnight. Patient feels well. Denies chills, headaches, chest pain, shortness of breath, palpitations, abdominal pain, nausea and vomiting, and LE edema. Tolerated Gi lite diet.       Inpatient Medications  Current Facility-Administered Medications   Medication Dose Route Frequency    lidocaine (LIDODERM) 5 % patch 1 Patch  1 Patch TransDERmal Q24H    miSOPROStol (CYTOTEC) tablet 200 mcg  200 mcg Oral Q6H    sodium chloride (NS) flush 5-10 mL  5-10 mL IntraVENous Q8H    sodium chloride (NS) flush 5-10 mL  5-10 mL IntraVENous PRN    pantoprazole (PROTONIX) 40 mg in sodium chloride 0.9 % 10 mL injection  40 mg IntraVENous Q12H    0.9% sodium chloride infusion 250 mL  250 mL IntraVENous PRN         Allergies  No Known Allergies      Objective  Vitals:  Patient Vitals for the past 8 hrs:   Temp Pulse Resp BP SpO2   01/21/17 2000 98.2 °F (36.8 °C) 100 14 129/76 -   01/21/17 1600 98.2 °F (36.8 °C) 97 18 101/75 98 %         I/O:    Intake/Output Summary (Last 24 hours) at 01/21/17 2058  Last data filed at 01/21/17 1813   Gross per 24 hour   Intake             1440 ml   Output             2600 ml   Net            -1160 ml     Last shift:       Last 3 shifts:    01/20 0701 - 01/21 1900  In: 1440 [P.O.:1440]  Out: 2940 [Urine:1800]    Physical Exam:  General: No acute distress. Alert. Cooperative. Head: Normocephalic. Atraumatic. Eyes:  Conjunctiva pink. Sclera white. PERRL   Nose:  Septum midline. Mucosa pink. No drainage. Throat: Mucosa pink. Moist mucous membranes. No tonsillar exudates or erythema. Palate movement equal bilaterally. Respiratory: CTAB. No w/r/r/c.   Cardiovascular: RRR. Normal S1,S2. No m/r/g. Pulses 2+ throughout. GI: + bowel sounds. Nontender. No rebound tenderness or guarding. Nondistended   Extremities: No edema. No palpable cord. No tenderness.      Laboratory Data  Recent Results (from the past 12 hour(s))   HGB & HCT    Collection Time: 01/21/17 10:31 AM   Result Value Ref Range    HGB 8.3 (L) 12.1 - 17.0 g/dL    HCT 26.0 (L) 36.6 - 50.3 %   HGB & HCT    Collection Time: 01/21/17  4:59 PM   Result Value Ref Range    HGB 8.7 (L) 12.1 - 17.0 g/dL    HCT 25.9 (L) 36.6 - 50.3 %     Hospital Problems  Date Reviewed: 1/20/2017          Codes Class Noted POA    * (Principal)GI bleed due to NSAIDs ICD-10-CM: K92.2, T39.395A  ICD-9-CM: 578.9, E935.8  1/20/2017 Unknown        History of ulcer disease ICD-10-CM: Z87.898  ICD-9-CM: V13.89  1/20/2017 Unknown        Chronic hepatitis C without hepatic coma ([facility] ICD-10-CM: B18.2  ICD-9-CM: 070.54  12/14/2015 Yes        Essential hypertension ICD-10-CM: I10  ICD-9-CM: 401.9  11/9/2015 Yes

## 2017-01-22 NOTE — DISCHARGE SUMMARY
[address] [facility]     Discharge Summary     Patient: Lakeisha Billings MRN: [id number]       YOB: 1960       Age: 64 y.o. Date of admission:  1/20/2017    Date of discharge:  1/23/2017    Primary care provider:  Marco Brown MD     Admitting provider:  Caleb Ulloa MD    Discharging provider(s): Keri Rasmussen MD - Family Medicine Resident  Dr. LuisA Barbosa - Attending, Family Medicine     Discharge destination: Home. The patient is stable for discharge. HPI as per admitting resident, Dr. Elvin Nogueira: Lakeisha Billings is a 64 y.o. male with a history of PUD, HTN, knee pain, who presents complaining of episodes of hematemesis and melena. Mr. Jose Antonio says that four days ago he noticed black stools and black vomit. He says he first noticed the emesis. He says that he had not eaten around 8-9AM and then he began to feel nauseous and threw up dark coffee ground emesis. He says he had abdominal pain afterwards followed by several more episodes of vomiting and then noticed dark stools later that day. Of note, patient was started on diclofenac 75mg BID for knee pain about 2 weeks ago. He says he stopped the diclofenac that day (Monday was the last dose) because he figured it was related to his symptoms. He has been drinking lots of fluids and gatorade to try and stay hydrated. Patient says he has not had any more bowel movements since Wednesday. He says he has some reflux symptoms but no more vomiting episodes since Wednesday. Drinks a couple beers a day. Does not smoke currently, quit around 1986. Vitals: T - 97.9, HR - 107, BP - 116/49, RR - 20, O2 - 99% on Room Air. Labs: notable for Hb 7.7 (baseline not known). Hematocrit 23.9.     Admission diagnosis  GI bleed due to NSAIDs  Melena    Diagnoses and hospital course    GI Bleed secondary to multiple duodenal ulcers and NSAID use- Patient with a history of GIB 2/2 PUD presented with hematemesis and melena. EGD performed yesterday identifying multplie duodenal ulcers and had 1 vessel injected with epinephrine and cauterization. Hgb has been stable more than 24 hours.   -Start Omeprazole Q8H  -Iron Supplementation with Colace  -F/U with GI on discharge  -EGD in 6 weeks      HTN - Pt currently hypotensive-low normotensive.   - Hold Lisinopril 10mg and Norvasc 10mg at home     Hepatitis C, chronic: Patient had elevated LTFs in 11/2015 and at the beginning of the New Year expressed to his PCP that he was concerned that he had hepatitis C. Genotype was positive for Type 1a and referred to Dr. Barby Ott, hepatology. Follow-up Information     Follow up With Details Comments Contact Ej Herron MD Go on 1/25/2017 Hospital follow up, Blood level check and Blood pressure check. At 10:30  [facility]  [phone]      Kumar Calvo MD Go on 2/14/2017 Follow up gastrointestinal bleeding. At 10:00 AM by at the office at 9:30 AM. [address] [facility] Go on 2/27/2017 Follow up hepatitis. At 10:00 AM [address]  [phone]            FOLLOW-UP TESTS RECOMMENDED:   · CBC, CMP, EGD in 6 weeks, and as recommended by follow up physician     PENDING TEST RESULTS:  At the time of discharge the following test results are still pending: None. Please review these results as they become available. Medications at discharge  Current Discharge Medication List      START taking these medications    Details   docusate sodium (COLACE) 100 mg capsule Take 1 Cap by mouth two (2) times a day for 90 days. Qty: 60 Cap, Refills: 0      !! ferrous sulfate 325 mg (65 mg iron) tablet Take 1 Tab by mouth daily (with breakfast). Qty: 30 Tab, Refills: 2      !! ferrous sulfate 325 mg (65 mg iron) tablet Take 1 Tab by mouth two (2) times daily (with meals).   Qty: 60 Tab, Refills: 0      miSOPROStol (CYTOTEC) 200 mcg tablet Take 1 Tab by mouth every six (6) hours for 30 days. Indications: NSAID-INDUCED GASTRIC ULCER  Qty: 120 Tab, Refills: 0      omeprazole (PRILOSEC OTC) 20 mg tablet Take 20 mg by mouth Before breakfast, lunch, and dinner for 30 days. Indications: Duodenal Ulcer  Qty: 90 Tab, Refills: 0       !! - Potential duplicate medications found. Please discuss with provider. STOP taking these medications       diclofenac EC (VOLTAREN) 75 mg EC tablet Comments:   Reason for Stopping:         lisinopril (PRINIVIL, ZESTRIL) 10 mg tablet Comments:   Reason for Stopping:         amLODIPine (NORVASC) 10 mg tablet Comments:   Reason for Stopping:              Consultations  IP CONSULT TO GASTROENTEROLOGY    Procedures/Imaging  EGD    Physical examination at discharge  Visit Vitals    /66 (BP 1 Location: Left arm, BP Patient Position: At rest)    Pulse 94    Temp 98.2 °F (36.8 °C)    Resp 18    Ht 5' 10.5\" (1.791 m)    Wt 267 lb 13.7 oz (121.5 kg)    SpO2 98%    BMI 37.89 kg/m2     General - NAD, alert and oriented  Cardiovascular - RRR, normal S1 and S2 no murmur, gallops or  rubs  Respiratory - CTAB, no wheezes, rales or rhonchi  Abdominal - positive bowel sounds, soft, non-tender, no rebound, guarding  Extremities - no edema, cyanosis, 2+ peripheral pulses     Recent Labs      01/23/17   0333  01/22/17   0630  01/22/17   0036   01/21/17   0447   WBC  17.2*   --   17.1*   --   18.2*   HGB  8.2*  8.2*  8.4*  8.1*   < >  8.1*   HCT  25.6*  25.0*  25.6*  24.6*   < >  25.2*   PLT  392   --   367   --   323    < > = values in this interval not displayed.      Recent Labs      01/23/17   0333  01/22/17   0036  01/21/17   0447   NA  138  139  147*   K  3.5  3.8  3.6   CL  107  108  115*   CO2  22 23  22   BUN  15  26*  40*   CREA  1.01  1.12  1.02   GLU  96  106*  96   CA  7.4*  7.4*  7.6*       Signed:      Sharyn Alfaro MD   Family Medicine Resident 1/23/2017     Dr. Clydia Oppenheim, MD    Family Medicine Attending        Cc:  Gwendolyn Sandoval MD

## 2017-01-22 NOTE — PROGRESS NOTES
Bedside and Verbal shift change report given to CARON Tripp (oncoming nurse) by Christopher Loera RN (offgoing nurse). Report included the following information SBAR, Procedure Summary, Intake/Output, MAR and Cardiac Rhythm NSR   .

## 2017-01-22 NOTE — PROGRESS NOTES
SHIFT CHANGE REPORT:  1 Verbal report received from [facility]  Report consisted of patients Situation, Background, Assessment and    Recommendations(SBAR), Kardex, and Rhythm.     Opportunity for questions and clarification was provided.        SHIFT SUMMARY:  No significant events or complaints     END OF SHIFT REPORT:  0700 Bedside shift change report given to [facility] (oncoming nurse) by Yumiko Davison RN (offgoing nurse).  Report included the following information SBAR, Kardex, Intake/Output, MAR, Recent Results and Cardiac Rhythm SR.

## 2017-01-22 NOTE — PROGRESS NOTES
Gastrointestinal Progress Note    1/22/2017    Admit Date: 1/20/2017    Subjective:  comfortable     New Complaints Today:  No: no vomiting    Pain: Patient complains of abdominal pain no. Bowel Movements: None    Bleeding:  None    Current Facility-Administered Medications   Medication Dose Route Frequency    lidocaine (LIDODERM) 5 % patch 1 Patch  1 Patch TransDERmal Q24H    miSOPROStol (CYTOTEC) tablet 200 mcg  200 mcg Oral Q6H    sodium chloride (NS) flush 5-10 mL  5-10 mL IntraVENous Q8H    sodium chloride (NS) flush 5-10 mL  5-10 mL IntraVENous PRN    pantoprazole (PROTONIX) 40 mg in sodium chloride 0.9 % 10 mL injection  40 mg IntraVENous Q12H        Objective:     Blood pressure 122/67, pulse 92, temperature 98 °F (36.7 °C), resp. rate 24, height 5' 10.5\" (1.791 m), weight 121.5 kg (267 lb 13.7 oz), SpO2 97 %.     01/22 0701 - 01/22 1900  In: 960 [P.O.:960]  Out: -     01/20 1901 - 01/22 0700  In: 1440 [P.O.:1440]  Out: 3667 [Urine:2275]    EXAM:  GENERAL: alert, cooperative, no distress, HEART: regular rate and rhythm, LUNGS: chest clear, no wheezing, rales, normal symmetric air entry, ABDOMEN:  Bowel sounds are normal, liver is not enlarged, spleen is not enlarged and EXTREMITY: no edema      Data Review    Recent Results (from the past 24 hour(s))   HGB & HCT    Collection Time: 01/21/17  4:59 PM   Result Value Ref Range    HGB 8.7 (L) 12.1 - 17.0 g/dL    HCT 25.9 (L) 36.6 - 90.3 %   METABOLIC PANEL, BASIC    Collection Time: 01/22/17 12:36 AM   Result Value Ref Range    Sodium 139 136 - 145 mmol/L    Potassium 3.8 3.5 - 5.1 mmol/L    Chloride 108 97 - 108 mmol/L    CO2 23 21 - 32 mmol/L    Anion gap 8 5 - 15 mmol/L    Glucose 106 (H) 65 - 100 mg/dL    BUN 26 (H) 6 - 20 MG/DL    Creatinine 1.12 0.70 - 1.30 MG/DL    BUN/Creatinine ratio 23 (H) 12 - 20      GFR est AA >60 >60 ml/min/1.73m2    GFR est non-AA >60 >60 ml/min/1.73m2    Calcium 7.4 (L) 8.5 - 10.1 MG/DL   CBC WITH AUTOMATED DIFF Collection Time: 01/22/17 12:36 AM   Result Value Ref Range    WBC 17.1 (H) 4.1 - 11.1 K/uL    RBC 2.67 (L) 4.10 - 5.70 M/uL    HGB 8.1 (L) 12.1 - 17.0 g/dL    HCT 24.6 (L) 36.6 - 50.3 %    MCV 92.1 80.0 - 99.0 FL    MCH 30.3 26.0 - 34.0 PG    MCHC 32.9 30.0 - 36.5 g/dL    RDW 16.7 (H) 11.5 - 14.5 %    PLATELET 949 543 - 921 K/uL    NEUTROPHILS 36 32 - 75 %    LYMPHOCYTES 52 (H) 12 - 49 %    MONOCYTES 7 5 - 13 %    EOSINOPHILS 5 0 - 7 %    BASOPHILS 0 0 - 1 %    NRBC 1.0 (H) 0  WBC    ABS. NEUTROPHILS 6.2 1.8 - 8.0 K/UL    ABS. LYMPHOCYTES 8.8 (H) 0.8 - 3.5 K/UL    ABS. MONOCYTES 1.2 (H) 0.0 - 1.0 K/UL    ABS. EOSINOPHILS 0.9 (H) 0.0 - 0.4 K/UL    ABS. BASOPHILS 0.0 0.0 - 0.1 K/UL    DF MANUAL      RBC COMMENTS ANISOCYTOSIS  1+        RBC COMMENTS HYPOCHROMIA  1+        RBC COMMENTS BASOPHILIC STIPPLING  PRESENT       NUCLEATED RBC    Collection Time: 01/22/17 12:36 AM   Result Value Ref Range    NRBC 0.0 0  WBC    ABSOLUTE NRBC 0.00 0.00 - 0.01 K/uL   HGB & HCT    Collection Time: 01/22/17  6:30 AM   Result Value Ref Range    HGB 8.4 (L) 12.1 - 17.0 g/dL    HCT 25.6 (L) 36.6 - 50.3 %       Assessment:     Principal Problem:    GI bleed due to NSAIDs (1/20/2017)    Active Problems:    Essential hypertension (11/9/2015)      Chronic hepatitis C without hepatic coma ([facility] [address] (12/14/2015)      History of ulcer disease (1/20/2017)        Plan:     1. Ferous sulfate  2.   Change pantoprazole to oral

## 2017-01-22 NOTE — PROGRESS NOTES
Assumed care of patient. Patient had one additional BM overnight in addition to two during day shift on  Yesterday. Patient inquiring as to when he may be able to go home. Will ask family practice when they round on this patient. Hgb ordered for 1800.      0810  Patient up, making his bed. Consumed 100% of breakfast.  No complaints of pain o/than persistent right knee ache.

## 2017-01-23 VITALS
HEART RATE: 94 BPM | HEIGHT: 71 IN | TEMPERATURE: 98.2 F | OXYGEN SATURATION: 98 % | SYSTOLIC BLOOD PRESSURE: 138 MMHG | DIASTOLIC BLOOD PRESSURE: 66 MMHG | BODY MASS INDEX: 37.5 KG/M2 | RESPIRATION RATE: 18 BRPM | WEIGHT: 267.86 LBS

## 2017-01-23 LAB
ANION GAP BLD CALC-SCNC: 9 MMOL/L (ref 5–15)
BASOPHILS # BLD AUTO: 0.3 K/UL (ref 0–0.1)
BASOPHILS # BLD: 2 % (ref 0–1)
BUN SERPL-MCNC: 15 MG/DL (ref 6–20)
BUN/CREAT SERPL: 15 (ref 12–20)
CALCIUM SERPL-MCNC: 7.4 MG/DL (ref 8.5–10.1)
CHLORIDE SERPL-SCNC: 107 MMOL/L (ref 97–108)
CO2 SERPL-SCNC: 22 MMOL/L (ref 21–32)
CREAT SERPL-MCNC: 1.01 MG/DL (ref 0.7–1.3)
DIFFERENTIAL METHOD BLD: ABNORMAL
EOSINOPHIL # BLD: 0.9 K/UL (ref 0–0.4)
EOSINOPHIL NFR BLD: 5 % (ref 0–7)
ERYTHROCYTE [DISTWIDTH] IN BLOOD BY AUTOMATED COUNT: 16.7 % (ref 11.5–14.5)
GLUCOSE SERPL-MCNC: 96 MG/DL (ref 65–100)
HCT VFR BLD AUTO: 25 % (ref 36.6–50.3)
HCT VFR BLD AUTO: 25.6 % (ref 36.6–50.3)
HGB BLD-MCNC: 8.2 G/DL (ref 12.1–17)
HGB BLD-MCNC: 8.2 G/DL (ref 12.1–17)
LYMPHOCYTES # BLD AUTO: 53 % (ref 12–49)
LYMPHOCYTES # BLD: 9.1 K/UL (ref 0.8–3.5)
MCH RBC QN AUTO: 29.9 PG (ref 26–34)
MCHC RBC AUTO-ENTMCNC: 32 G/DL (ref 30–36.5)
MCV RBC AUTO: 93.4 FL (ref 80–99)
MONOCYTES # BLD: 1.2 K/UL (ref 0–1)
MONOCYTES NFR BLD AUTO: 7 % (ref 5–13)
NEUTS SEG # BLD: 5.7 K/UL (ref 1.8–8)
NEUTS SEG NFR BLD AUTO: 33 % (ref 32–75)
PATH REV BLD -IMP: ABNORMAL
PLATELET # BLD AUTO: 392 K/UL (ref 150–400)
POTASSIUM SERPL-SCNC: 3.5 MMOL/L (ref 3.5–5.1)
RBC # BLD AUTO: 2.74 M/UL (ref 4.1–5.7)
RBC MORPH BLD: ABNORMAL
RBC MORPH BLD: ABNORMAL
SODIUM SERPL-SCNC: 138 MMOL/L (ref 136–145)
WBC # BLD AUTO: 17.2 K/UL (ref 4.1–11.1)
WBC MORPH BLD: ABNORMAL

## 2017-01-23 PROCEDURE — 85025 COMPLETE CBC W/AUTO DIFF WBC: CPT | Performed by: FAMILY MEDICINE

## 2017-01-23 PROCEDURE — 74011250637 HC RX REV CODE- 250/637: Performed by: INTERNAL MEDICINE

## 2017-01-23 PROCEDURE — 80048 BASIC METABOLIC PNL TOTAL CA: CPT | Performed by: FAMILY MEDICINE

## 2017-01-23 PROCEDURE — 85018 HEMOGLOBIN: CPT | Performed by: FAMILY MEDICINE

## 2017-01-23 PROCEDURE — 74011250637 HC RX REV CODE- 250/637: Performed by: FAMILY MEDICINE

## 2017-01-23 PROCEDURE — 36415 COLL VENOUS BLD VENIPUNCTURE: CPT | Performed by: FAMILY MEDICINE

## 2017-01-23 RX ORDER — MISOPROSTOL 200 UG/1
200 TABLET ORAL EVERY 6 HOURS
Qty: 120 TAB | Refills: 0 | Status: SHIPPED | OUTPATIENT
Start: 2017-01-23 | End: 2017-02-22

## 2017-01-23 RX ORDER — OMEPRAZOLE 20 MG/1
20 TABLET, DELAYED RELEASE ORAL
Qty: 90 TAB | Refills: 0 | Status: SHIPPED | OUTPATIENT
Start: 2017-01-23 | End: 2017-01-23

## 2017-01-23 RX ORDER — OMEPRAZOLE 20 MG/1
40 TABLET, DELAYED RELEASE ORAL
Qty: 90 TAB | Refills: 0 | Status: SHIPPED | OUTPATIENT
Start: 2017-01-23 | End: 2017-01-23

## 2017-01-23 RX ORDER — OMEPRAZOLE 20 MG/1
20 TABLET, DELAYED RELEASE ORAL
Qty: 90 TAB | Refills: 0 | Status: SHIPPED | OUTPATIENT
Start: 2017-01-23 | End: 2017-02-22

## 2017-01-23 RX ORDER — LANOLIN ALCOHOL/MO/W.PET/CERES
325 CREAM (GRAM) TOPICAL 2 TIMES DAILY WITH MEALS
Qty: 60 TAB | Refills: 0 | Status: SHIPPED | OUTPATIENT
Start: 2017-01-23 | End: 2017-03-10 | Stop reason: SDUPTHER

## 2017-01-23 RX ORDER — LANOLIN ALCOHOL/MO/W.PET/CERES
325 CREAM (GRAM) TOPICAL
Qty: 30 TAB | Refills: 2 | Status: SHIPPED | OUTPATIENT
Start: 2017-01-23 | End: 2017-01-25 | Stop reason: SDUPTHER

## 2017-01-23 RX ORDER — DOCUSATE SODIUM 100 MG/1
100 CAPSULE, LIQUID FILLED ORAL 2 TIMES DAILY
Qty: 60 CAP | Refills: 0 | Status: SHIPPED | OUTPATIENT
Start: 2017-01-23 | End: 2017-04-23

## 2017-01-23 RX ADMIN — MISOPROSTOL 200 MCG: 200 TABLET ORAL at 06:09

## 2017-01-23 RX ADMIN — Medication 10 ML: at 06:10

## 2017-01-23 RX ADMIN — PANTOPRAZOLE SODIUM 40 MG: 40 TABLET, DELAYED RELEASE ORAL at 06:09

## 2017-01-23 RX ADMIN — FERROUS SULFATE TAB 325 MG (65 MG ELEMENTAL FE) 325 MG: 325 (65 FE) TAB at 08:36

## 2017-01-23 RX ADMIN — MISOPROSTOL 200 MCG: 200 TABLET ORAL at 11:32

## 2017-01-23 NOTE — PROGRESS NOTES
I have reviewed discharge instructions with the patient and spouse. The patient and spouse verbalized understanding. Removed peripheral IV and placed order for volunteer. Gave opportunity for questions. 2:25 PM Patient transported downstairs via [address] by volunteer.

## 2017-01-23 NOTE — PROGRESS NOTES
TRANSFER - OUT REPORT:    Verbal report given to CARON Pro(name) on [facility].  being transferred to [facility], RN(unit) for routine progression of care       Report consisted of patients Situation, Background, Assessment and   Recommendations(SBAR). Information from the following report(s) SBAR, Procedure Summary, Intake/Output, MAR and Cardiac Rhythm NSR was reviewed with the receiving nurse. Lines:   Peripheral IV 01/20/17 Left Antecubital (Active)   Site Assessment Clean, dry, & intact 1/22/2017  7:21 AM   Phlebitis Assessment 0 1/22/2017  7:21 AM   Infiltration Assessment 0 1/22/2017  7:21 AM   Dressing Status Clean, dry, & intact 1/22/2017  7:21 AM   Dressing Type Tape;Transparent 1/22/2017  7:21 AM   Hub Color/Line Status Green;Capped 1/22/2017  7:21 AM   Alcohol Cap Used Yes 1/22/2017  7:21 AM       Peripheral IV 01/20/17 Right Antecubital (Active)   Site Assessment Clean, dry, & intact 1/22/2017  7:21 AM   Phlebitis Assessment 0 1/22/2017  7:21 AM   Infiltration Assessment 0 1/22/2017  7:21 AM   Dressing Status Clean, dry, & intact 1/22/2017  7:21 AM   Dressing Type Tape;Transparent 1/22/2017  7:21 AM   Hub Color/Line Status Pink;Capped 1/22/2017  7:21 AM   Action Taken Open ports on tubing capped 1/20/2017  3:29 PM   Alcohol Cap Used Yes 1/22/2017  7:21 AM       Peripheral IV 01/20/17 Right Hand (Active)   Site Assessment Clean, dry, & intact 1/22/2017  7:21 AM   Phlebitis Assessment 0 1/22/2017  7:21 AM   Infiltration Assessment 0 1/22/2017  7:21 AM   Dressing Status Clean, dry, & intact 1/22/2017  7:21 AM   Dressing Type Transparent 1/22/2017  7:21 AM   Hub Color/Line Status Pink 1/22/2017  7:21 AM   Alcohol Cap Used Yes 1/22/2017  7:21 AM        Opportunity for questions and clarification was provided.       Patient transported with:   [employer]

## 2017-01-23 NOTE — INTERDISCIPLINARY ROUNDS
Interdisciplinary team rounds were held 1/23/2017 with the following team members:Care Management, Oncology Nurse Practitioner, Nursing, Nutrition, Pharmacy, Physical Therapy and Physician. Plan of care discussed. See clinical pathway and/or care plan for interventions and desired outcomes.     Anticipated discharge: today

## 2017-01-23 NOTE — DISCHARGE INSTRUCTIONS
HOME DISCHARGE INSTRUCTIONS    [facility]. / [id number] : 1960    Admission date: 2017 Discharge date: 2017     Please bring this form with you to show your care provider at your follow-up appointment. Primary care provider:  Heather Sharma MD    Discharging provider:  Luis Aguirre MD  - Family Medicine Resident  Min Dias MD - Attending, Family Medicine     You have been admitted to the hospital with the following diagnoses:    ACUTE DIAGNOSES:  GI bleed due to NSAIDs  Melena  . . . . . . . . . . . . . . . . . . . . . . . . . . . . . . . . . . . . . . . . . . . . . . . . . . . . . . . . . . . . . . . . . . . . . . . Marilyn Gearing FOLLOW-UP CARE RECOMMENDATIONS:    Your symptoms were likely caused by the medication diclofenac. I recommend you STOP taking this medication immediately and refrain from taking that or other NSAIDs (Like ibuprofen) in the future. You may take tylenol, however, please limit this to 2000mg daily given your hepatitis. Discuss further pain control options with the Primary Care Physician. Start taking Misoprostol 200 mcg every 6 hours and Prilosec 40 mg every 8 hours. Please do not resume your blood pressure medications until up follow up with your PCP and recheck your BP. Please follow up with your Primary Care Physician your Helen Conner Blood Cells count. Please follow up with your PCP, Dr. Sharon Enriquez from Gastroenterology, and Dr. Timmy Guardado from Hepatology      Follow-up tests needed: CBC, EGD in 6 weeks for f/u of duodenal ulcers    Pending test results: At the time of your discharge the following test results are still pending: None. Please make sure you review these results with your outpatient follow-up provider(s). Specific symptoms to watch for: chest pain, shortness of breath, fever (100.4 Farenheit or greater), chills, nausea, vomiting, diarrhea, change in mentation, falling, weakness, bleeding.      DIET/what to eat:  Lizabeth Leaks to resume previous diet. However, for information on bland options, which may help minimize symptoms, see below    ACTIVITY:  Activity as tolerated and no driving for today    Wound care: None    Equipment needed:  None    What to do if new or unexpected symptoms occur? If you experience any of the above symptoms (or should other concerns or questions arise after discharge) please call your primary care physician. Return to the emergency room if you cannot get hold of your doctor. · It is very important that you keep your follow-up appointment(s). · Please bring discharge papers, medication list (and/or medication bottles) to your follow-up appointments for review by your outpatient provider(s). · Please check the list of medications and be sure it includes every medication (even non-prescription medications) that your provider wants you to take. · It is important that you take the medication exactly as they are prescribed. · Keep your medication in the bottles provided by the pharmacist and keep a list of the medication names, dosages, and times to be taken in your wallet. · Do not take other medications without consulting your doctor. · If you have any questions about your medications or other instructions, please talk to your nurse or care provider before you leave the hospital.     Information obtained by:     I understand that if any problems occur once I am at home I am to contact my physician. These instructions were explained to me and I had the opportunity to ask questions. I understand and acknowledge receipt of the instructions indicated above.                                                                                                                                                Physician's or R.N.'s Signature                                                                  Date/Time Patient or Representative Signature                                                          Date/Time        Eating Healthy Foods: Care Instructions  Your Care Instructions  Eating healthy foods can help lower your risk for disease. Healthy food gives you energy and keeps your heart strong, your brain active, your muscles working, and your bones strong. A healthy diet includes a variety of foods from the basic food groups: grains, vegetables, fruits, milk and milk products, and meat and beans. Some people may eat more of their favorite foods from only one food group and, as a result, miss getting the nutrients they need. So, it is important to pay attention not only to what you eat but also to what you are missing from your diet. You can eat a healthy, balanced diet by making a few small changes. Follow-up care is a key part of your treatment and safety. Be sure to make and go to all appointments, and call your doctor if you are having problems. Its also a good idea to know your test results and keep a list of the medicines you take. How can you care for yourself at home? Look at what you eat  · Keep a food diary for a week or two and record everything you eat or drink. Track the number of servings you eat from each food group. · For a balanced diet every day, eat a variety of:  ¨ 6 or more ounce-equivalents of grains, such as cereals, breads, crackers, rice, or pasta, every day. An ounce-equivalent is 1 slice of bread, 1 cup of ready-to-eat cereal, or ½ cup of cooked rice, cooked pasta, or cooked cereal.  ¨ 2½ cups of vegetables, especially:  § Dark-green vegetables such as broccoli and spinach. § Orange vegetables such as carrots and sweet potatoes. § Dry beans (such as mera and kidney beans) and peas (such as lentils). ¨ 2 cups of fresh, frozen, or canned fruit. A small apple or 1 banana or orange equals 1 cup.   ¨ 3 cups of nonfat or low-fat milk, yogurt, or other milk products. ¨ 5½ ounces of meat and beans, such as chicken, fish, lean meat, beans, nuts, and seeds. One egg, 1 tablespoon of peanut butter, ½ ounce nuts or seeds, or ¼ cup of cooked beans equals 1 ounce of meat. · Learn how to read food labels for serving sizes and ingredients. Fast-food and convenience-food meals often contain few or no fruits or vegetables. Make sure you eat some fruits and vegetables to make the meal more nutritious. · Look at your food diary. For each food group, add up what you have eaten and then divide the total by the number of days. This will give you an idea of how much you are eating from each food group. See if you can find some ways to change your diet to make it more healthy. Start small  · Do not try to make dramatic changes to your diet all at once. You might feel that you are missing out on your favorite foods and then be more likely to fail. · Start slowly, and gradually change your habits. Try some of the following:  ¨ Use whole wheat bread instead of white bread. ¨ Use nonfat or low-fat milk instead of whole milk. ¨ Eat brown rice instead of white rice, and eat whole wheat pasta instead of white-flour pasta. ¨ Try low-fat cheeses and low-fat yogurt. ¨ Add more fruits and vegetables to meals and have them for snacks. ¨ Add lettuce, tomato, cucumber, and onion to sandwiches. ¨ Add fruit to yogurt and cereal.  Enjoy food  · You can still eat your favorite foods. You just may need to eat less of them. If your favorite foods are high in fat, salt, and sugar, limit how often you eat them, but do not cut them out entirely. · Eat a wide variety of foods. Make healthy choices when eating out  · The type of restaurant you choose can help you make healthy choices. Even fast-food chains are now offering more low-fat or healthier choices on the menu. · Choose smaller portions, or take half of your meal home.   · When eating out, try:  ¨ A veggie pizza with a whole wheat crust or grilled chicken (instead of sausage or pepperoni). ¨ Pasta with roasted vegetables, grilled chicken, or marinara sauce instead of cream sauce. ¨ A vegetable wrap or grilled chicken wrap. ¨ Broiled or poached food instead of fried or breaded items. Make healthy choices easy  · Buy packaged, prewashed, ready-to-eat fresh vegetables and fruits, such as baby carrots, salad mixes, and chopped or shredded broccoli and cauliflower. · Buy packaged, presliced fruits, such as melon or pineapple. · Choose 100% fruit or vegetable juice instead of soda. Limit juice intake to 4 to 6 oz (½ to ¾ cup) a day. · Blend low-fat yogurt, fruit juice, and canned or frozen fruit to make a smoothie for breakfast or a snack. Where can you learn more? Go to http://radhaThird Screen Mediamiranda.info/. Enter T756 in the search box to learn more about \"Eating Healthy Foods: Care Instructions. \"  Current as of: November 20, 2015  Content Version: 11.1  © 0552-2087 [employer]. Care instructions adapted under license by [employer] (which disclaims liability or warranty for this information). If you have questions about a medical condition or this instruction, always ask your healthcare professional. [place] [address] any warranty or liability for your use of this information. Starting a Weight Loss Plan: Care Instructions  Your Care Instructions  If you are thinking about losing weight, it can be hard to know where to start. Your doctor can help you set up a weight loss plan that best meets your needs. You may want to take a class on nutrition or exercise, or join a weight loss support group. If you have questions about how to make changes to your eating or exercise habits, ask your doctor about seeing a registered dietitian or an exercise specialist.  It can be a big challenge to lose weight. But you do not have to make huge changes at once. Make small changes, and stick with them. When those changes become habit, add a few more changes. If you do not think you are ready to make changes right now, try to pick a date in the future. Make an appointment to see your doctor to discuss whether the time is right for you to start a plan. Follow-up care is a key part of your treatment and safety. Be sure to make and go to all appointments, and call your doctor if you are having problems. Its also a good idea to know your test results and keep a list of the medicines you take. How can you care for yourself at home? · Set realistic goals. Many people expect to lose much more weight than is likely. A weight loss of 5% to 10% of your body weight may be enough to improve your health. · Get family and friends involved to provide support. Talk to them about why you are trying to lose weight, and ask them to help. They can help by participating in exercise and having meals with you, even if they may be eating something different. · Find what works best for you. If you do not have time or do not like to cook, a program that offers meal replacement bars or shakes may be better for you. Or if you like to prepare meals, finding a plan that includes daily menus and recipes may be best.  · Ask your doctor about other health professionals who can help you achieve your weight loss goals. ¨ A dietitian can help you make healthy changes in your diet. ¨ An exercise specialist or  can help you develop a safe and effective exercise program.  ¨ A counselor or psychiatrist can help you cope with issues such as depression, anxiety, or family problems that can make it hard to focus on weight loss. · Consider joining a support group for people who are trying to lose weight. Your doctor can suggest groups in your area. Where can you learn more? Go to http://radha-miranda.info/.   Enter X496 in the search box to learn more about \"Starting a Weight Loss Plan: Care Instructions. \"  Current as of: February 16, 2016  Content Version: 11.1  © 4127-4364 [employer], [facility]. Care instructions adapted under license by [employer] (which disclaims liability or warranty for this information). If you have questions about a medical condition or this instruction, always ask your healthcare professional. [place] [address] any warranty or liability for your use of this information.

## 2017-01-23 NOTE — PROGRESS NOTES
Arash Payer. Jason Murphy MD  [phone] office  [phone] voicemail     Gastroenterology Progress Note    2017  Admit Date: 2017         Narrative Assessment and Plan   · PUD with bleeding  · Severe OA with knee pain which improved with NSAIDs  · Anemia of acute GI blood loss stable    Difficult balance here, as he reports he would need NSAIDs in order to keep his arthritic pain under control well enough to work. PUD with bleeding, making NSAIDs at least in the short term contraindicated  I suggest the followin) discharge with advice - no NSAIDs, PPI daily prior to breakfast, iron sulfate BID, no need for misoprostol/cytotec outpatient. 2) Note for work - either light duty or sick leave for 6 weeks as we work to improve his anemia and try to return him to NSAID therapy  3) analgesics for knee pain - tylenol or opioids, I leave that decision to you  4) EGD in 6 weeks, if ulcers healed then he can take NSAIDs so long as he remains on chronic PPI  5) Arrange consultation with Dr. Shahida Bowser for evaluation and potential therapy for HCV    From my standpoint he may be discharged today    Subjective:   · I am better, no more melena, no abdominal pain, no vomiting. ROS:  The previous review of systems on initial consultation / H&P is noted and reviewed. Specific changes noted above in HPI. Current Medications:     Current Facility-Administered Medications   Medication Dose Route Frequency    pantoprazole (PROTONIX) tablet 40 mg  40 mg Oral ACB&D    ferrous sulfate tablet 325 mg  1 Tab Oral DAILY WITH BREAKFAST    lidocaine (LIDODERM) 5 % patch 1 Patch  1 Patch TransDERmal Q24H    miSOPROStol (CYTOTEC) tablet 200 mcg  200 mcg Oral Q6H    sodium chloride (NS) flush 5-10 mL  5-10 mL IntraVENous Q8H    sodium chloride (NS) flush 5-10 mL  5-10 mL IntraVENous PRN       Objective:     VITALS:   Last 24hrs VS reviewed since prior progress note.  Most recent are:  Visit Vitals    /74 (BP 1 Location: Left arm, BP Patient Position: At rest)    Pulse (!) 106    Temp 99.2 °F (37.3 °C)    Resp 20    Ht 5' 10.5\" (1.791 m)    Wt 121.5 kg (267 lb 13.7 oz)    SpO2 97%    BMI 37.89 kg/m2     Temp (24hrs), Av.4 °F (36.9 °C), Min:98 °F (36.7 °C), Max:99.2 °F (37.3 °C)      Intake/Output Summary (Last 24 hours) at 17 0731  Last data filed at 17 1855   Gross per 24 hour   Intake             1440 ml   Output              500 ml   Net              940 ml       EXAM:  General:  Comfortable, no distress    HEENT:  Atraumatic skull, pupils equal  Lungs:   No abnormal audible breath sounds. Speaking in complete sentences  Heart:   No abnormal audible heart sounds. Well perfused  Abdomen:   Nondistended, nontender. No mass, guarding or rebound  Musc:   No skeletal defects or deformities  Neurologic:   Cranial nerves grossly intact, moves all 4 extremities  Psych:    Good insight. Not anxious nor agitated  Derm:   No rash, jaundice, nor palpable dermatologic mass    Lab Data Reviewed:   Recent Labs      17   0630  176   17   0447   WBC  17.2*   --   17.1*   --   18.2*   HGB  8.2*  8.2*  8.4*  8.1*   < >  8.1*   HCT  25.6*  25.0*  25.6*  24.6*   < >  25.2*   PLT  392   --   367   --   323    < > = values in this interval not displayed. Recent Labs      17   0333  01/22/17   0036  177   NA  138  139  147*   K  3.5  3.8  3.6   CL  107  108  115*   CO2  22  23  22   GLU  96  106*  96   BUN  15  26*  40*   CREA  1.01  1.12  1.02   CA  7.4*  7.4*  7.6*     No results found for: GLUCPOC  No results for input(s): PH, PCO2, PO2, HCO3, FIO2 in the last 72 hours. No results for input(s): INR in the last 72 hours.     No lab exists for component: INREXT        Assessment:   (See above)  Principal Problem:    GI bleed due to NSAIDs (2017)    Active Problems:    Essential hypertension (2015)      Chronic hepatitis C without hepatic coma ([facility] [address] (12/14/2015)      History of ulcer disease (1/20/2017)      PUD (peptic ulcer disease) (1/22/2017)      Acute blood loss anemia (1/22/2017)        Plan:   (See above)      Signed By: Debi Delgado MD     1/23/2017  7:31 AM

## 2017-01-23 NOTE — CDMP QUERY
Thank you for documenting the diagnosis of Hepatitis C. Based on your medical judgment, could you please clarify if this diagnosis is      =>Acute  =>Chronic  =>Other Explanation of clinical findings  =>Unable to Determine (no explanation of clinical findings)    Please clarify and document your clinical opinion in the progress notes and discharge summary including the definitive and/or presumptive diagnosis, (suspected or probable), related to the above clinical findings. Please include clinical findings supporting your diagnosis.     [facility], [address] [place] [address]  [email or web address]

## 2017-01-23 NOTE — PROGRESS NOTES
Nurse navigator, Eleni Clements, was notified that pt is being discharged from [facility].   Radhika Wood LCSW

## 2017-01-23 NOTE — ROUTINE PROCESS
Bedside and Verbal shift change report given to [address] (oncoming nurse) by Alfredo Alva (offgoing nurse). Report included the following information SBAR, Kardex, Procedure Summary, Intake/Output, MAR, Accordion, Recent Results and Med Rec Status.

## 2017-01-25 ENCOUNTER — OFFICE VISIT (OUTPATIENT)
Dept: FAMILY MEDICINE CLINIC | Age: 57
End: 2017-01-25

## 2017-01-25 VITALS
TEMPERATURE: 98.5 F | WEIGHT: 267 LBS | HEIGHT: 71 IN | HEART RATE: 79 BPM | RESPIRATION RATE: 22 BRPM | DIASTOLIC BLOOD PRESSURE: 64 MMHG | BODY MASS INDEX: 37.38 KG/M2 | SYSTOLIC BLOOD PRESSURE: 115 MMHG

## 2017-01-25 DIAGNOSIS — K27.9 PEPTIC ULCER DISEASE: ICD-10-CM

## 2017-01-25 DIAGNOSIS — M25.562 CHRONIC PAIN OF LEFT KNEE: ICD-10-CM

## 2017-01-25 DIAGNOSIS — G89.29 CHRONIC PAIN OF LEFT KNEE: ICD-10-CM

## 2017-01-25 DIAGNOSIS — K92.2 GI BLEED DUE TO NSAIDS: Primary | ICD-10-CM

## 2017-01-25 DIAGNOSIS — D50.8 OTHER IRON DEFICIENCY ANEMIA: ICD-10-CM

## 2017-01-25 DIAGNOSIS — T39.395A GI BLEED DUE TO NSAIDS: Primary | ICD-10-CM

## 2017-01-25 RX ORDER — SUCRALFATE 1 G/10ML
1 SUSPENSION ORAL 4 TIMES DAILY
Qty: 414 ML | Refills: 2 | Status: SHIPPED | OUTPATIENT
Start: 2017-01-25 | End: 2017-03-08 | Stop reason: SDUPTHER

## 2017-01-25 RX ORDER — DICLOFENAC SODIUM 20 MG/G
2 SOLUTION TOPICAL
Qty: 1 BOTTLE | Refills: 2 | Status: SHIPPED | OUTPATIENT
Start: 2017-01-25

## 2017-01-25 RX ORDER — LANOLIN ALCOHOL/MO/W.PET/CERES
325 CREAM (GRAM) TOPICAL
Qty: 90 TAB | Refills: 2 | Status: SHIPPED | OUTPATIENT
Start: 2017-01-25 | End: 2017-05-04 | Stop reason: SDUPTHER

## 2017-01-25 NOTE — PROGRESS NOTES
Yolanda Alexis. is a 64 y.o. male   Chief Complaint   Patient presents with   [facility] Follow Up     [facility] 01/20/17,  Gi bleed    pt here for hosp f/u from GI bleed. Pt states that the diclofenac PO was working very well for him when he was using it. Pt advised that if has similar symptoms or anything urgent in future not to use mychart and to contact office. Pt currently on iron tid. Discussed using topical version. Pt advised if any recurring symptoms to contact office immediately, any repeat bleeding go to ED. Chief Complaint   Patient presents with   [facility] Follow Up     [facility] 01/20/17,  Gi bleed     he is a 64y.o. year old male who presents for evalution. Reviewed PmHx, RxHx, FmHx, SocHx, AllgHx and updated and dated in the chart. Review of Systems - negative except as listed above in the HPI    Objective:     Vitals:    01/25/17 1122   BP: 115/64   Pulse: 79   Resp: 22   Temp: 98.5 °F (36.9 °C)   TempSrc: Oral   Weight: 267 lb (121.1 kg)   Height: 5' 10.5\" (1.791 m)       Current Outpatient Prescriptions   Medication Sig    sucralfate (CARAFATE) 100 mg/mL suspension Take 10 mL by mouth four (4) times daily.  ferrous sulfate 325 mg (65 mg iron) tablet Take 1 Tab by mouth three (3) times daily (with meals).  diclofenac sodium (PENNSAID) 20 mg/gram /actuation(2 %) sopm 2 Actuation(s) by Apply Externally route two (2) times daily as needed.  docusate sodium (COLACE) 100 mg capsule Take 1 Cap by mouth two (2) times a day for 90 days.  ferrous sulfate 325 mg (65 mg iron) tablet Take 1 Tab by mouth two (2) times daily (with meals).  miSOPROStol (CYTOTEC) 200 mcg tablet Take 1 Tab by mouth every six (6) hours for 30 days. Indications: NSAID-INDUCED GASTRIC ULCER    omeprazole (PRILOSEC OTC) 20 mg tablet Take 20 mg by mouth Before breakfast, lunch, and dinner for 30 days. Indications: Duodenal Ulcer     No current facility-administered medications for this visit.         Physical Examination: General appearance - alert, well appearing, and in no distress  Eyes - pupils equal and reactive, extraocular eye movements intact  Chest - clear to auscultation, no wheezes, rales or rhonchi, symmetric air entry  Heart - normal rate, regular rhythm, normal S1, S2, no murmurs, rubs, clicks or gallops  Abdomen - tenderness noted mild epigastrium      Assessment/ Plan:   Cole Torrez was seen today for hospital follow up. Diagnoses and all orders for this visit:    GI bleed due to NSAIDs  -     sucralfate (CARAFATE) 100 mg/mL suspension; Take 10 mL by mouth four (4) times daily. -     ferrous sulfate 325 mg (65 mg iron) tablet; Take 1 Tab by mouth three (3) times daily (with meals). Peptic ulcer disease  -     sucralfate (CARAFATE) 100 mg/mL suspension; Take 10 mL by mouth four (4) times daily. Other iron deficiency anemia  -     ferrous sulfate 325 mg (65 mg iron) tablet; Take 1 Tab by mouth three (3) times daily (with meals). Chronic pain of left knee  -     diclofenac sodium (PENNSAID) 20 mg/gram /actuation(2 %) sopm; 2 Actuation(s) by Apply Externally route two (2) times daily as needed. Follow-up Disposition:  Return in about 6 weeks (around 3/8/2017), or if symptoms worsen or fail to improve. I have discussed the diagnosis with the patient and the intended plan as seen in the above orders. The patient has received an after-visit summary and questions were answered concerning future plans. Pt conveyed understanding of plan.     Medication Side Effects and Warnings were discussed with patient      Elie Ayers DO

## 2017-01-25 NOTE — PROGRESS NOTES
Chief Complaint   Patient presents with   [facility] Follow Up     [facility] 01/20/17,  Gi bleed         Pt stated he is eating regular foods, still cont with hospital diet, Gi-light  Continues to have to partial black stool.   Denies abd pain, gas only

## 2017-01-25 NOTE — PATIENT INSTRUCTIONS
Peptic Ulcer Disease: Care Instructions  Your Care Instructions    Peptic ulcers are sores on the inside of the stomach or the small intestine. They are usually caused by an infection with bacteria or from use of nonsteroidal anti-inflammatory drugs (NSAIDs). NSAIDs include aspirin, ibuprofen (Advil), and naproxen (Aleve). Your doctor may have prescribed medicine to reduce stomach acid. You also may need to take antibiotics if your peptic ulcers are caused by an infection. You can help your stomach heal and keep ulcers from coming back by making some changes in your lifestyle. Quit smoking, limit caffeine and alcohol, and reduce stress. Follow-up care is a key part of your treatment and safety. Be sure to make and go to all appointments, and call your doctor if you are having problems. Its also a good idea to know your test results and keep a list of the medicines you take. How can you care for yourself at home? · Take your medicines exactly as prescribed. Call your doctor if you think you are having a problem with your medicine. · Do not take aspirin or other NSAIDs such as ibuprofen (Advil or Motrin) or naproxen (Aleve). Ask your doctor what you can take for pain. · Do not smoke. Smoking can make ulcers worse. If you need help quitting, talk to your doctor about stop-smoking programs and medicines. These can increase your chances of quitting for good. · Drink in moderation or avoid drinking alcohol. · Do not drink beverages that have caffeine if they bother your stomach. These include coffee, tea, and soda. · Eat a balanced diet of small, frequent meals. Make an appointment with a dietitian if you need help planning your meals. · Reduce stress. Avoid people and places that make you feel anxious, if you can. Learn ways to reduce stress, such as biofeedback, guided imagery, and meditation. When should you call for help? Call 911 anytime you think you may need emergency care.  For example, call if:  · You passed out (lost consciousness). · You vomit blood or what looks like coffee grounds. · You pass maroon or very bloody stools. Call your doctor now or seek immediate medical care if:  · You have severe pain in your belly, back, or shoulders. · You have new or worsening belly pain. · You are dizzy or lightheaded, or you feel like you may faint. · Your stools are black and tarlike or have streaks of blood. Watch closely for changes in your health, and be sure to contact your doctor if:  · You have new symptoms such as weight loss, nausea or vomiting. · You do not feel better as expected. Where can you learn more? Go to http://radha-miranda.info/. Enter S150 in the search box to learn more about \"Peptic Ulcer Disease: Care Instructions. \"  Current as of: August 9, 2016  Content Version: 11.1  © 3461-1885 [employer]. Care instructions adapted under license by [employer] (which disclaims liability or warranty for this information). If you have questions about a medical condition or this instruction, always ask your healthcare professional. [place] [address] any warranty or liability for your use of this information.

## 2017-03-08 DIAGNOSIS — T39.395A GI BLEED DUE TO NSAIDS: ICD-10-CM

## 2017-03-08 DIAGNOSIS — K27.9 PEPTIC ULCER DISEASE: ICD-10-CM

## 2017-03-08 DIAGNOSIS — K92.2 GI BLEED DUE TO NSAIDS: ICD-10-CM

## 2017-03-08 RX ORDER — SUCRALFATE 1 G/10ML
SUSPENSION ORAL
Qty: 414 ML | Refills: 2 | Status: SHIPPED | OUTPATIENT
Start: 2017-03-08

## 2017-03-10 ENCOUNTER — OFFICE VISIT (OUTPATIENT)
Dept: FAMILY MEDICINE CLINIC | Age: 57
End: 2017-03-10

## 2017-03-10 VITALS
OXYGEN SATURATION: 97 % | HEART RATE: 103 BPM | BODY MASS INDEX: 38.84 KG/M2 | HEIGHT: 71 IN | TEMPERATURE: 98.2 F | DIASTOLIC BLOOD PRESSURE: 90 MMHG | RESPIRATION RATE: 20 BRPM | SYSTOLIC BLOOD PRESSURE: 154 MMHG | WEIGHT: 277.4 LBS

## 2017-03-10 DIAGNOSIS — G89.29 CHRONIC PAIN OF BOTH KNEES: ICD-10-CM

## 2017-03-10 DIAGNOSIS — M25.561 CHRONIC PAIN OF BOTH KNEES: ICD-10-CM

## 2017-03-10 DIAGNOSIS — I10 ESSENTIAL HYPERTENSION: ICD-10-CM

## 2017-03-10 DIAGNOSIS — M25.562 CHRONIC PAIN OF BOTH KNEES: ICD-10-CM

## 2017-03-10 DIAGNOSIS — D50.0 IRON DEFICIENCY ANEMIA DUE TO CHRONIC BLOOD LOSS: Primary | ICD-10-CM

## 2017-03-10 RX ORDER — AMLODIPINE BESYLATE 10 MG/1
TABLET ORAL
Qty: 30 TAB | Refills: 3 | Status: SHIPPED | OUTPATIENT
Start: 2017-03-10 | End: 2017-04-24 | Stop reason: SDUPTHER

## 2017-03-10 RX ORDER — ASCORBIC ACID 500 MG
TABLET ORAL
COMMUNITY

## 2017-03-10 RX ORDER — TRAMADOL HYDROCHLORIDE 50 MG/1
50 TABLET ORAL
Qty: 30 TAB | Refills: 1 | Status: SHIPPED | OUTPATIENT
Start: 2017-03-10 | End: 2017-08-11 | Stop reason: SDUPTHER

## 2017-03-10 NOTE — PROGRESS NOTES
Gerri Bansal. is a 64 y.o. male   Chief Complaint   Patient presents with    Follow-up    Knee Pain    Labs    pt has not been taking his BP meds since anemia, will restart norvasc pt will keep a bp log and send me list of BP's next week and if needed will add on lisinopril. Pt also reports some knee pain on the right and left. Pt is interested in synvisc injections and will send to ortho. Pt has colonoscopy scheduled April 7. Anemia last hgb 8.2 but states has been higher since with Dr Stephania Alvarado. he is a 64y.o. year old male who presents for evalution. Reviewed PmHx, RxHx, FmHx, SocHx, AllgHx and updated and dated in the chart. Review of Systems - negative except as listed above in the HPI    Objective:     Vitals:    03/10/17 1000   BP: 154/90   Pulse: (!) 103   Resp: 20   Temp: 98.2 °F (36.8 °C)   TempSrc: Oral   SpO2: 97%   Weight: 277 lb 6.4 oz (125.8 kg)   Height: 5' 10.5\" (1.791 m)       Current Outpatient Prescriptions   Medication Sig    ascorbic acid, vitamin C, (VITAMIN C) 500 mg tablet Take  by mouth.  amLODIPine (NORVASC) 10 mg tablet TAKE 1 TABLET BY MOUTH DAILY    traMADol (ULTRAM) 50 mg tablet Take 1 Tab by mouth every eight (8) hours as needed for Pain. Max Daily Amount: 150 mg.    CARAFATE 100 mg/mL suspension TAKE 10 ML BY MOUTH FOUR (4) TIMES DAILY.  ferrous sulfate 325 mg (65 mg iron) tablet Take 1 Tab by mouth three (3) times daily (with meals).  docusate sodium (COLACE) 100 mg capsule Take 1 Cap by mouth two (2) times a day for 90 days.  diclofenac sodium (PENNSAID) 20 mg/gram /actuation(2 %) sopm 2 Actuation(s) by Apply Externally route two (2) times daily as needed. No current facility-administered medications for this visit.         Physical Examination: General appearance - alert, well appearing, and in no distress  Eyes - pupils equal and reactive, extraocular eye movements intact  Chest - clear to auscultation, no wheezes, rales or rhonchi, symmetric air entry  Heart - normal rate, regular rhythm, normal S1, S2, no murmurs, rubs, clicks or gallops  Musculoskeletal - pain with varus valgus stress b/l knee, neg ant post draw  Extremities - peripheral pulses normal, no pedal edema, no clubbing or cyanosis  Skin - normal coloration and turgor, no rashes, no suspicious skin lesions noted      Assessment/ Plan:   Jeremy Soto was seen today for follow-up, knee pain and labs. Diagnoses and all orders for this visit:    Iron deficiency anemia due to chronic blood loss  -     CBC WITH AUTOMATED DIFF  Start iron pills f/u GI  Essential hypertension  -     amLODIPine (NORVASC) 10 mg tablet; TAKE 1 TABLET BY MOUTH DAILY  Send kessage in a week with BP readings and if remains elevated will restart lisinopril  Chronic pain of both knees  -     traMADol (ULTRAM) 50 mg tablet; Take 1 Tab by mouth every eight (8) hours as needed for Pain. Max Daily Amount: 150 mg.  -     REFERRAL TO ORTHOPEDICS     Follow-up Disposition:  Return in about 1 month (around 4/10/2017), or if symptoms worsen or fail to improve. I have discussed the diagnosis with the patient and the intended plan as seen in the above orders. The patient has received an after-visit summary and questions were answered concerning future plans. Pt conveyed understanding of plan.     Medication Side Effects and Warnings were discussed with patient      Samir Fields DO

## 2017-03-11 LAB
BASOPHILS # BLD AUTO: 0.1 X10E3/UL (ref 0–0.2)
BASOPHILS NFR BLD AUTO: 1 %
EOSINOPHIL # BLD AUTO: 0.6 X10E3/UL (ref 0–0.4)
EOSINOPHIL NFR BLD AUTO: 5 %
ERYTHROCYTE [DISTWIDTH] IN BLOOD BY AUTOMATED COUNT: 15.7 % (ref 12.3–15.4)
HCT VFR BLD AUTO: 41.4 % (ref 37.5–51)
HGB BLD-MCNC: 13.7 G/DL (ref 12.6–17.7)
IMM GRANULOCYTES # BLD: 0 X10E3/UL (ref 0–0.1)
IMM GRANULOCYTES NFR BLD: 0 %
LYMPHOCYTES # BLD AUTO: 5.1 X10E3/UL (ref 0.7–3.1)
LYMPHOCYTES NFR BLD AUTO: 45 %
MCH RBC QN AUTO: 30.2 PG (ref 26.6–33)
MCHC RBC AUTO-ENTMCNC: 33.1 G/DL (ref 31.5–35.7)
MCV RBC AUTO: 91 FL (ref 79–97)
MONOCYTES # BLD AUTO: 1.4 X10E3/UL (ref 0.1–0.9)
MONOCYTES NFR BLD AUTO: 12 %
NEUTROPHILS # BLD AUTO: 4.2 X10E3/UL (ref 1.4–7)
NEUTROPHILS NFR BLD AUTO: 37 %
PLATELET # BLD AUTO: 391 X10E3/UL (ref 150–379)
RBC # BLD AUTO: 4.53 X10E6/UL (ref 4.14–5.8)
WBC # BLD AUTO: 11.4 X10E3/UL (ref 3.4–10.8)

## 2017-03-20 DIAGNOSIS — I10 ESSENTIAL HYPERTENSION: Primary | ICD-10-CM

## 2017-03-20 RX ORDER — LISINOPRIL 10 MG/1
10 TABLET ORAL DAILY
Qty: 30 TAB | Refills: 1 | Status: SHIPPED | OUTPATIENT
Start: 2017-03-20 | End: 2017-04-24 | Stop reason: SDUPTHER

## 2017-04-24 DIAGNOSIS — I10 ESSENTIAL HYPERTENSION: ICD-10-CM

## 2017-04-24 RX ORDER — LISINOPRIL 10 MG/1
10 TABLET ORAL DAILY
Qty: 30 TAB | Refills: 1 | Status: SHIPPED | OUTPATIENT
Start: 2017-04-24 | End: 2017-05-25 | Stop reason: SDUPTHER

## 2017-04-24 RX ORDER — AMLODIPINE BESYLATE 10 MG/1
TABLET ORAL
Qty: 30 TAB | Refills: 3 | Status: SHIPPED | OUTPATIENT
Start: 2017-04-24 | End: 2017-06-27 | Stop reason: SDUPTHER

## 2017-05-04 DIAGNOSIS — K92.2 GI BLEED DUE TO NSAIDS: ICD-10-CM

## 2017-05-04 DIAGNOSIS — T39.395A GI BLEED DUE TO NSAIDS: ICD-10-CM

## 2017-05-04 RX ORDER — LANOLIN ALCOHOL/MO/W.PET/CERES
CREAM (GRAM) TOPICAL
Qty: 90 TAB | Refills: 2 | Status: SHIPPED | OUTPATIENT
Start: 2017-05-04

## 2017-05-25 DIAGNOSIS — I10 ESSENTIAL HYPERTENSION: ICD-10-CM

## 2017-05-25 RX ORDER — LISINOPRIL 10 MG/1
TABLET ORAL
Qty: 60 TAB | Refills: 1 | Status: SHIPPED | OUTPATIENT
Start: 2017-05-25

## 2017-06-22 DIAGNOSIS — I10 ESSENTIAL HYPERTENSION: ICD-10-CM

## 2017-06-22 RX ORDER — LISINOPRIL 10 MG/1
TABLET ORAL
Qty: 30 TAB | Refills: 1 | Status: SHIPPED | OUTPATIENT
Start: 2017-06-22 | End: 2017-08-22 | Stop reason: SDUPTHER

## 2017-06-27 DIAGNOSIS — I10 ESSENTIAL HYPERTENSION: ICD-10-CM

## 2017-06-27 RX ORDER — AMLODIPINE BESYLATE 10 MG/1
TABLET ORAL
Qty: 90 TAB | Refills: 1 | Status: SHIPPED | OUTPATIENT
Start: 2017-06-27

## 2017-08-01 ENCOUNTER — OFFICE VISIT (OUTPATIENT)
Dept: FAMILY MEDICINE CLINIC | Age: 57
End: 2017-08-01

## 2017-08-01 VITALS
DIASTOLIC BLOOD PRESSURE: 82 MMHG | HEIGHT: 71 IN | WEIGHT: 257 LBS | TEMPERATURE: 98.7 F | OXYGEN SATURATION: 97 % | BODY MASS INDEX: 35.98 KG/M2 | HEART RATE: 70 BPM | RESPIRATION RATE: 20 BRPM | SYSTOLIC BLOOD PRESSURE: 138 MMHG

## 2017-08-01 DIAGNOSIS — K57.92 ACUTE DIVERTICULITIS: ICD-10-CM

## 2017-08-01 DIAGNOSIS — K52.9 GASTROENTERITIS: Primary | ICD-10-CM

## 2017-08-01 RX ORDER — METRONIDAZOLE 500 MG/1
500 TABLET ORAL 3 TIMES DAILY
Qty: 30 TAB | Refills: 0 | Status: SHIPPED | OUTPATIENT
Start: 2017-08-01 | End: 2017-08-02 | Stop reason: SDUPTHER

## 2017-08-01 RX ORDER — PROMETHAZINE HYDROCHLORIDE 25 MG/1
25 TABLET ORAL
Qty: 20 TAB | Refills: 0 | Status: SHIPPED | OUTPATIENT
Start: 2017-08-01

## 2017-08-01 RX ORDER — CIPROFLOXACIN 500 MG/1
500 TABLET ORAL 2 TIMES DAILY
Qty: 20 TAB | Refills: 0 | Status: SHIPPED | OUTPATIENT
Start: 2017-08-01 | End: 2017-08-02 | Stop reason: SDUPTHER

## 2017-08-01 NOTE — LETTER
NOTIFICATION RETURN TO WORK / SCHOOL 
 
8/1/2017 2:23 PM 
 
Mr. Marilyn Driscoll. [address] [place] [id number] To Whom It May Concern: 
 
Marilyn Driscoll. is currently under the care of [address]. He will return to work on: 8/7/17 If there are questions or concerns please have the patient contact our office. Sincerely, Fredrick Simeon, DO

## 2017-08-01 NOTE — PROGRESS NOTES
Shreyas Kauffman. is a 64 y.o. male   Chief Complaint   Patient presents with    Nausea    Diarrhea    pt states he has been having N/V/D Sunday and yesterday. Pt has not eaten anything today, but has been staying hydrated. Pt was bitten by a tick 1.5 weeks ago but denies any rash from this. Pt does have some abd pain from wretch ing. No blood in stool or emesis. Pt also with LLQ diet. he is a 64y.o. year old male who presents for evalution. Reviewed PmHx, RxHx, FmHx, SocHx, AllgHx and updated and dated in the chart. Review of Systems - negative except as listed above in the HPI    Objective:     Vitals:    08/01/17 1410   BP: 138/82   Pulse: 70   Resp: 20   Temp: 98.7 °F (37.1 °C)   TempSrc: Oral   SpO2: 97%   Weight: 257 lb (116.6 kg)   Height: 5' 10.5\" (1.791 m)       Current Outpatient Prescriptions   Medication Sig    ciprofloxacin HCl (CIPRO) 500 mg tablet Take 1 Tab by mouth two (2) times a day for 10 days.  metroNIDAZOLE (FLAGYL) 500 mg tablet Take 1 Tab by mouth three (3) times daily for 10 days.  promethazine (PHENERGAN) 25 mg tablet Take 1 Tab by mouth every six (6) hours as needed for Nausea.  amLODIPine (NORVASC) 10 mg tablet Take 1 tablet by mouth  daily    diclofenac EC (VOLTAREN) 75 mg EC tablet TAKE 1 TABLET BY MOUTH TWICE A DAY AS NEEDED    lisinopril (PRINIVIL, ZESTRIL) 10 mg tablet TAKE 1 TABLET BY MOUTH DAILY.  lisinopril (PRINIVIL, ZESTRIL) 10 mg tablet Take 1 tablet by mouth  daily    ferrous sulfate 325 mg (65 mg iron) tablet TAKE 1 TAB BY MOUTH THREE (3) TIMES DAILY (WITH MEALS).  ascorbic acid, vitamin C, (VITAMIN C) 500 mg tablet Take  by mouth.  traMADol (ULTRAM) 50 mg tablet Take 1 Tab by mouth every eight (8) hours as needed for Pain. Max Daily Amount: 150 mg.    CARAFATE 100 mg/mL suspension TAKE 10 ML BY MOUTH FOUR (4) TIMES DAILY.     diclofenac sodium (PENNSAID) 20 mg/gram /actuation(2 %) sopm 2 Actuation(s) by Apply Externally route two (2) times daily as needed. No current facility-administered medications for this visit. Physical Examination: General appearance - alert, well appearing, and in no distress  Eyes - pupils equal and reactive, extraocular eye movements intact  Chest - clear to auscultation, no wheezes, rales or rhonchi, symmetric air entry  Heart - normal rate, regular rhythm, normal S1, S2, no murmurs, rubs, clicks or gallops  Abdomen - tenderness noted LLQ      Assessment/ Plan:   Diagnoses and all orders for this visit:    1. Gastroenteritis  -     promethazine (PHENERGAN) 25 mg tablet; Take 1 Tab by mouth every six (6) hours as needed for Nausea. 2. Acute diverticulitis  -     ciprofloxacin HCl (CIPRO) 500 mg tablet; Take 1 Tab by mouth two (2) times a day for 10 days. -     metroNIDAZOLE (FLAGYL) 500 mg tablet; Take 1 Tab by mouth three (3) times daily for 10 days. liquid diet advance to brat tomorrow if feeling better then advance as tolerated  Follow-up Disposition:  Return if symptoms worsen or fail to improve. I have discussed the diagnosis with the patient and the intended plan as seen in the above orders. The patient has received an after-visit summary and questions were answered concerning future plans. Pt conveyed understanding of plan.     Medication Side Effects and Warnings were discussed with patient      Shannan Luna,

## 2017-08-02 DIAGNOSIS — K57.92 ACUTE DIVERTICULITIS: ICD-10-CM

## 2017-08-02 RX ORDER — METRONIDAZOLE 500 MG/1
500 TABLET ORAL 3 TIMES DAILY
Qty: 30 TAB | Refills: 0 | Status: SHIPPED | OUTPATIENT
Start: 2017-08-02 | End: 2017-08-12

## 2017-08-02 RX ORDER — CIPROFLOXACIN 500 MG/1
500 TABLET ORAL 2 TIMES DAILY
Qty: 20 TAB | Refills: 0 | Status: SHIPPED | OUTPATIENT
Start: 2017-08-02 | End: 2017-08-11 | Stop reason: ALTCHOICE

## 2017-08-11 ENCOUNTER — OFFICE VISIT (OUTPATIENT)
Dept: FAMILY MEDICINE CLINIC | Age: 57
End: 2017-08-11

## 2017-08-11 VITALS
HEART RATE: 98 BPM | TEMPERATURE: 98.3 F | HEIGHT: 70 IN | BODY MASS INDEX: 36.36 KG/M2 | SYSTOLIC BLOOD PRESSURE: 134 MMHG | RESPIRATION RATE: 16 BRPM | DIASTOLIC BLOOD PRESSURE: 85 MMHG | OXYGEN SATURATION: 99 % | WEIGHT: 254 LBS

## 2017-08-11 DIAGNOSIS — G89.29 CHRONIC PAIN OF BOTH KNEES: Primary | ICD-10-CM

## 2017-08-11 DIAGNOSIS — M25.561 CHRONIC PAIN OF BOTH KNEES: Primary | ICD-10-CM

## 2017-08-11 DIAGNOSIS — M25.562 CHRONIC PAIN OF BOTH KNEES: Primary | ICD-10-CM

## 2017-08-11 RX ORDER — TRAMADOL HYDROCHLORIDE 50 MG/1
50 TABLET ORAL
Qty: 30 TAB | Refills: 1 | Status: SHIPPED | OUTPATIENT
Start: 2017-08-11

## 2017-08-11 NOTE — PROGRESS NOTES
1. Have you been to the ER, urgent care clinic since your last visit? Hospitalized since your last visit? No    2. Have you seen or consulted any other health care providers outside of the [address] [place] [address] since your last visit? Include any pap smears or colon screening.  No     Chief Complaint   Patient presents with    Medication Refill     Tramadol

## 2017-08-11 NOTE — PROGRESS NOTES
Chief Complaint   Patient presents with    Medication Refill     Tramadol     he is a 64y.o. year old male who presents for evalution. Pt states has been only using 1/2 dose of Tramadol for severe knee pain. Has been improving in knees, no longer having to wear braces and feels like knees are getting stronger. Requesting refill today. Reviewed PmHx, RxHx, FmHx, SocHx, AllgHx and updated and dated in the chart. Review of Systems - negative except as listed above in the HPI    Objective:     Vitals:    08/11/17 0947   BP: 134/85   Pulse: 98   Resp: 16   Temp: 98.3 °F (36.8 °C)   TempSrc: Oral   SpO2: 99%   Weight: 254 lb (115.2 kg)   Height: 5' 10\" (1.778 m)     Physical Examination: General appearance - alert, well appearing, and in no distress  Chest - clear to auscultation, no wheezes, rales or rhonchi, symmetric air entry  Heart - normal rate, regular rhythm, normal S1, S2, no murmurs, rubs, clicks or gallops  Musculoskeletal - abnormal exam of both movements painful     Assessment/ Plan:   Diagnoses and all orders for this visit:    1. Chronic pain of both knees  -     traMADol (ULTRAM) 50 mg tablet; Take 1 Tab by mouth every eight (8) hours as needed for Pain. Max Daily Amount: 150 mg. Reviewed , no data for patient. Discussed with pt if needed recurrent fills would need to have pain contract on file. However, because continues to improve pt is hoping may no longer need medication. Pt voiced understanding regarding plan of care. Follow-up Disposition:  Return if symptoms worsen or fail to improve. I have discussed the diagnosis with the patient and the intended plan as seen in the above orders. The patient has received an after-visit summary and questions were answered concerning future plans.      Medication Side Effects and Warnings were discussed with patient    Quintin Rasmussen NP

## 2017-08-11 NOTE — MR AVS SNAPSHOT
Visit Information Date & Time Provider Department Dept. Phone Encounter #  
 8/11/2017  9:45 AM Abimbola Rubalcava NP [address] [facility] [phone] [id number] Follow-up Instructions Return if symptoms worsen or fail to improve. Upcoming Health Maintenance Date Due COLONOSCOPY 12/13/1978 Pneumococcal 19-64 Medium Risk (1 of 1 - PPSV23) 12/13/1979 DTaP/Tdap/Td series (1 - Tdap) 12/13/1981 INFLUENZA AGE 9 TO ADULT 8/1/2017 Allergies as of 8/11/2017  Review Complete On: 8/11/2017 By: Abimbola Rubalcava NP Severity Noted Reaction Type Reactions Nsaids (Non-steroidal Anti-inflammatory Drug)  01/21/2017   Side Effect Other (comments) Duodenal ulcers with bleeding requiring blood transfusions - 01/2017 Current Immunizations  Reviewed on 12/14/2015 No immunizations on file. Not reviewed this visit You Were Diagnosed With   
  
 Codes Comments Chronic pain of both knees    -  Primary ICD-10-CM: M25.561, M25.562, G89.29 ICD-9-CM: 719.46, 338.29 Vitals BP Pulse Temp Resp Height(growth percentile) Weight(growth percentile) 134/85 98 98.3 °F (36.8 °C) (Oral) 16 5' 10\" (1.778 m) 254 lb (115.2 kg) SpO2 BMI Smoking Status 99% 36.45 kg/m2 Former Smoker Vitals History BMI and BSA Data Body Mass Index Body Surface Area  
 36.45 kg/m 2 2.39 m 2 Preferred Pharmacy Pharmacy Name Phone [id number], [address] [phone] Your Updated Medication List  
  
   
This list is accurate as of: 8/11/17  9:57 AM.  Always use your most recent med list. amLODIPine 10 mg tablet Commonly known as:  [place] Lupe Take 1 tablet by mouth  daily  
  
 ascorbic acid (vitamin C) 500 mg tablet Commonly known as:  VITAMIN C Take  by mouth. CARAFATE 100 mg/mL suspension Generic drug:  sucralfate TAKE 10 ML BY MOUTH FOUR (4) TIMES DAILY. * diclofenac sodium 20 mg/gram /actuation(2 %) Sopm  
Commonly known as:  PENNSAID 2 Actuation(s) by Apply Externally route two (2) times daily as needed. * diclofenac EC 75 mg EC tablet Commonly known as:  VOLTAREN  
TAKE 1 TABLET BY MOUTH TWICE A DAY AS NEEDED  
  
 ferrous sulfate 325 mg (65 mg iron) tablet TAKE 1 TAB BY MOUTH THREE (3) TIMES DAILY (WITH MEALS). * lisinopril 10 mg tablet Commonly known as:  Kathyleen Charli Take 1 tablet by mouth  daily * lisinopril 10 mg tablet Commonly known as:  PRINIVIL, ZESTRIL  
TAKE 1 TABLET BY MOUTH DAILY. metroNIDAZOLE 500 mg tablet Commonly known as:  FLAGYL Take 1 Tab by mouth three (3) times daily for 10 days. promethazine 25 mg tablet Commonly known as:  PHENERGAN Take 1 Tab by mouth every six (6) hours as needed for Nausea. traMADol 50 mg tablet Commonly known as:  ULTRAM  
Take 1 Tab by mouth every eight (8) hours as needed for Pain. Max Daily Amount: 150 mg.  
  
 * Notice: This list has 4 medication(s) that are the same as other medications prescribed for you. Read the directions carefully, and ask your doctor or other care provider to review them with you. Prescriptions Printed Refills  
 traMADol (ULTRAM) 50 mg tablet 1 Sig: Take 1 Tab by mouth every eight (8) hours as needed for Pain. Max Daily Amount: 150 mg.  
 Class: Print Route: Oral  
  
Follow-up Instructions Return if symptoms worsen or fail to improve. Introducing \[facility]\"" & [facility] SERVICES! Dear Chel Jones: Thank you for requesting a [employer] account. Our records indicate that you already have an active [employer] account. You can access your account anytime at [email or web address] Did you know that you can access your hospital and ER discharge instructions at any time in [employer]? You can also review all of your test results from your hospital stay or ER visit. Additional Information If you have questions, please visit the Frequently Asked Questions section of the [employer] website at [email or web address] Remember, [employer] is NOT to be used for urgent needs. For medical emergencies, dial 911. Now available from your iPhone and Android! Please provide this summary of care documentation to your next provider. Your primary care clinician is listed as ESVIN DESIR. If you have any questions after today's visit, please call [phone].

## 2017-08-22 DIAGNOSIS — I10 ESSENTIAL HYPERTENSION: ICD-10-CM

## 2017-08-22 RX ORDER — LISINOPRIL 10 MG/1
TABLET ORAL
Qty: 30 TAB | Refills: 1 | Status: SHIPPED | OUTPATIENT
Start: 2017-08-22 | End: 2017-10-26 | Stop reason: SDUPTHER

## 2017-10-26 DIAGNOSIS — I10 ESSENTIAL HYPERTENSION: ICD-10-CM

## 2017-10-26 RX ORDER — LISINOPRIL 10 MG/1
TABLET ORAL
Qty: 30 TAB | Refills: 1 | Status: SHIPPED | OUTPATIENT
Start: 2017-10-26

## 2022-03-18 PROBLEM — D62 ACUTE BLOOD LOSS ANEMIA: Status: ACTIVE | Noted: 2017-01-22

## 2022-03-19 PROBLEM — K27.9 PUD (PEPTIC ULCER DISEASE): Status: ACTIVE | Noted: 2017-01-22

## 2022-03-19 PROBLEM — Z87.898 HISTORY OF ULCER DISEASE: Status: ACTIVE | Noted: 2017-01-20

## 2022-03-20 PROBLEM — K92.2 GI BLEED DUE TO NSAIDS: Status: ACTIVE | Noted: 2017-01-20

## 2022-03-20 PROBLEM — T39.395A GI BLEED DUE TO NSAIDS: Status: ACTIVE | Noted: 2017-01-20

## 2023-05-26 RX ORDER — TRAMADOL HYDROCHLORIDE 50 MG/1
50 TABLET ORAL EVERY 8 HOURS PRN
COMMUNITY
Start: 2017-08-11

## 2023-05-26 RX ORDER — DICLOFENAC SODIUM 20 MG/G
2 SOLUTION TOPICAL 2 TIMES DAILY PRN
COMMUNITY
Start: 2017-01-25

## 2023-05-26 RX ORDER — PROMETHAZINE HYDROCHLORIDE 25 MG/1
25 TABLET ORAL EVERY 6 HOURS PRN
COMMUNITY
Start: 2017-08-01

## 2023-05-26 RX ORDER — SUCRALFATE ORAL 1 G/10ML
SUSPENSION ORAL
COMMUNITY
Start: 2017-03-08

## 2023-05-26 RX ORDER — DICLOFENAC SODIUM 75 MG/1
1 TABLET, DELAYED RELEASE ORAL 2 TIMES DAILY PRN
COMMUNITY
Start: 2017-06-26

## 2023-05-26 RX ORDER — ASCORBIC ACID 500 MG
TABLET ORAL
COMMUNITY

## 2023-05-26 RX ORDER — LISINOPRIL 10 MG/1
1 TABLET ORAL DAILY
COMMUNITY
Start: 2017-05-25

## 2023-05-26 RX ORDER — FERROUS SULFATE 325(65) MG
TABLET ORAL
COMMUNITY
Start: 2017-05-04

## 2023-05-26 RX ORDER — AMLODIPINE BESYLATE 10 MG/1
1 TABLET ORAL DAILY
COMMUNITY
Start: 2017-06-27

## (undated) DEVICE — 3M™ CUROS™ DISINFECTING CAP FOR NEEDLELESS CONNECTORS 270/CARTON 20 CARTONS/CASE CFF1-270: Brand: CUROS™

## (undated) DEVICE — KENDALL RADIOLUCENT FOAM MONITORING ELECTRODE RECTANGULAR SHAPE: Brand: KENDALL

## (undated) DEVICE — BIPOLAR ELECTROHEMOSTASIS CATHETER: Brand: INJECTION GOLD PROBE

## (undated) DEVICE — TUBING ADMIN SET INTRAV ARTERI -- CONVERT TO ITEM 340436

## (undated) DEVICE — SIMPLICITY FLUFF UNDERPAD 23X36, MODERATE: Brand: SIMPLICITY

## (undated) DEVICE — 1200 GUARD II KIT W/5MM TUBE W/O VAC TUBE: Brand: GUARDIAN

## (undated) DEVICE — SOLIDIFIER MEDC 1200ML -- CONVERT TO 356117

## (undated) DEVICE — KIT COLON W/ 1.1OZ LUB AND 2 END

## (undated) DEVICE — BITEBLOCK ENDOSCP 60FR MAXI WHT POLYETH STURDY W/ VELC WVN